# Patient Record
Sex: MALE | Race: WHITE | Employment: FULL TIME | URBAN - METROPOLITAN AREA
[De-identification: names, ages, dates, MRNs, and addresses within clinical notes are randomized per-mention and may not be internally consistent; named-entity substitution may affect disease eponyms.]

---

## 2020-04-05 ENCOUNTER — APPOINTMENT (OUTPATIENT)
Dept: GENERAL RADIOLOGY | Age: 30
End: 2020-04-05
Payer: COMMERCIAL

## 2020-04-05 ENCOUNTER — HOSPITAL ENCOUNTER (EMERGENCY)
Age: 30
Discharge: HOME | End: 2020-04-06
Attending: EMERGENCY MEDICINE
Payer: COMMERCIAL

## 2020-04-05 ENCOUNTER — APPOINTMENT (OUTPATIENT)
Dept: CT IMAGING | Age: 30
End: 2020-04-05
Payer: COMMERCIAL

## 2020-04-05 LAB
A/G RATIO: 1.5 (ref 1.1–2.2)
ALBUMIN SERPL-MCNC: 4.5 G/DL (ref 3.4–5)
ALP BLD-CCNC: 76 U/L (ref 40–129)
ALT SERPL-CCNC: 20 U/L (ref 10–40)
ANION GAP SERPL CALCULATED.3IONS-SCNC: 15 MMOL/L (ref 3–16)
AST SERPL-CCNC: 14 U/L (ref 15–37)
BASOPHILS ABSOLUTE: 0.1 K/UL (ref 0–0.2)
BASOPHILS RELATIVE PERCENT: 0.6 %
BILIRUB SERPL-MCNC: 1 MG/DL (ref 0–1)
BUN BLDV-MCNC: 11 MG/DL (ref 7–20)
CALCIUM SERPL-MCNC: 9.8 MG/DL (ref 8.3–10.6)
CHLORIDE BLD-SCNC: 104 MMOL/L (ref 99–110)
CO2: 22 MMOL/L (ref 21–32)
CREAT SERPL-MCNC: 1 MG/DL (ref 0.9–1.3)
D DIMER: 0.52 UG/ML FEU
EOSINOPHILS ABSOLUTE: 0.4 K/UL (ref 0–0.6)
EOSINOPHILS RELATIVE PERCENT: 3.2 %
GFR AFRICAN AMERICAN: >60
GFR NON-AFRICAN AMERICAN: >60
GLOBULIN: 3 G/DL
GLUCOSE BLD-MCNC: 100 MG/DL (ref 70–99)
HCT VFR BLD CALC: 48.9 % (ref 40.5–52.5)
HEMOGLOBIN: 15.6 G/DL (ref 13.5–17.5)
LYMPHOCYTES ABSOLUTE: 2.2 K/UL (ref 1–5.1)
LYMPHOCYTES RELATIVE PERCENT: 17.7 %
MCH RBC QN AUTO: 28 PG (ref 26–34)
MCHC RBC AUTO-ENTMCNC: 31.8 G/DL (ref 31–36)
MCV RBC AUTO: 88 FL (ref 80–100)
MONOCYTES ABSOLUTE: 1.2 K/UL (ref 0–1.3)
MONOCYTES RELATIVE PERCENT: 9.5 %
NEUTROPHILS ABSOLUTE: 8.7 K/UL (ref 1.7–7.7)
NEUTROPHILS RELATIVE PERCENT: 69 %
PDW BLD-RTO: 13.4 % (ref 12.4–15.4)
PLATELET # BLD: 274 K/UL (ref 135–450)
PMV BLD AUTO: 8.7 FL (ref 5–10.5)
POTASSIUM REFLEX MAGNESIUM: 3.9 MMOL/L (ref 3.5–5.1)
RAPID INFLUENZA  B AGN: NEGATIVE
RAPID INFLUENZA A AGN: NEGATIVE
RBC # BLD: 5.55 M/UL (ref 4.2–5.9)
SODIUM BLD-SCNC: 141 MMOL/L (ref 136–145)
TOTAL PROTEIN: 7.5 G/DL (ref 6.4–8.2)
TROPONIN: <0.01 NG/ML
WBC # BLD: 12.6 K/UL (ref 4–11)

## 2020-04-05 PROCEDURE — 85025 COMPLETE CBC W/AUTO DIFF WBC: CPT

## 2020-04-05 PROCEDURE — 6360000002 HC RX W HCPCS: Performed by: EMERGENCY MEDICINE

## 2020-04-05 PROCEDURE — 84484 ASSAY OF TROPONIN QUANT: CPT

## 2020-04-05 PROCEDURE — 80053 COMPREHEN METABOLIC PANEL: CPT

## 2020-04-05 PROCEDURE — 85379 FIBRIN DEGRADATION QUANT: CPT

## 2020-04-05 PROCEDURE — 71260 CT THORAX DX C+: CPT

## 2020-04-05 PROCEDURE — 87804 INFLUENZA ASSAY W/OPTIC: CPT

## 2020-04-05 PROCEDURE — 71046 X-RAY EXAM CHEST 2 VIEWS: CPT

## 2020-04-05 PROCEDURE — 96374 THER/PROPH/DIAG INJ IV PUSH: CPT

## 2020-04-05 PROCEDURE — 99285 EMERGENCY DEPT VISIT HI MDM: CPT

## 2020-04-05 PROCEDURE — 93005 ELECTROCARDIOGRAM TRACING: CPT | Performed by: EMERGENCY MEDICINE

## 2020-04-05 PROCEDURE — 6360000004 HC RX CONTRAST MEDICATION: Performed by: EMERGENCY MEDICINE

## 2020-04-05 PROCEDURE — 36415 COLL VENOUS BLD VENIPUNCTURE: CPT

## 2020-04-05 RX ORDER — KETOROLAC TROMETHAMINE 30 MG/ML
15 INJECTION, SOLUTION INTRAMUSCULAR; INTRAVENOUS ONCE
Status: COMPLETED | OUTPATIENT
Start: 2020-04-05 | End: 2020-04-05

## 2020-04-05 RX ORDER — HYDROCODONE BITARTRATE AND ACETAMINOPHEN 5; 325 MG/1; MG/1
1 TABLET ORAL EVERY 6 HOURS PRN
Qty: 12 TABLET | Refills: 0 | Status: SHIPPED | OUTPATIENT
Start: 2020-04-05 | End: 2020-04-08

## 2020-04-05 RX ORDER — INDOMETHACIN 50 MG/1
50 CAPSULE ORAL 3 TIMES DAILY PRN
COMMUNITY

## 2020-04-05 RX ADMIN — IOPAMIDOL 100 ML: 755 INJECTION, SOLUTION INTRAVENOUS at 23:10

## 2020-04-05 RX ADMIN — KETOROLAC TROMETHAMINE 15 MG: 30 INJECTION, SOLUTION INTRAMUSCULAR at 22:11

## 2020-04-05 ASSESSMENT — PAIN DESCRIPTION - PROGRESSION: CLINICAL_PROGRESSION: GRADUALLY WORSENING

## 2020-04-05 ASSESSMENT — ENCOUNTER SYMPTOMS
RHINORRHEA: 0
VOMITING: 0
SORE THROAT: 0
EYE REDNESS: 0
EYE DISCHARGE: 0
COUGH: 1
WHEEZING: 0
SHORTNESS OF BREATH: 1
BACK PAIN: 1
DIARRHEA: 1
NAUSEA: 0
EYE PAIN: 0
ABDOMINAL PAIN: 0

## 2020-04-05 ASSESSMENT — PAIN DESCRIPTION - LOCATION
LOCATION: BACK

## 2020-04-05 ASSESSMENT — PAIN SCALES - GENERAL
PAINLEVEL_OUTOF10: 8
PAINLEVEL_OUTOF10: 4
PAINLEVEL_OUTOF10: 8

## 2020-04-05 ASSESSMENT — PAIN DESCRIPTION - DIRECTION
RADIATING_TOWARDS: RIBS
RADIATING_TOWARDS: RIBS

## 2020-04-05 ASSESSMENT — PAIN DESCRIPTION - ONSET: ONSET: PROGRESSIVE

## 2020-04-05 ASSESSMENT — PAIN - FUNCTIONAL ASSESSMENT: PAIN_FUNCTIONAL_ASSESSMENT: PREVENTS OR INTERFERES SOME ACTIVE ACTIVITIES AND ADLS

## 2020-04-05 ASSESSMENT — PAIN DESCRIPTION - DESCRIPTORS: DESCRIPTORS: SHARP;ACHING

## 2020-04-05 ASSESSMENT — PAIN DESCRIPTION - PAIN TYPE
TYPE: ACUTE PAIN

## 2020-04-05 ASSESSMENT — PAIN DESCRIPTION - FREQUENCY: FREQUENCY: INTERMITTENT

## 2020-04-05 ASSESSMENT — PAIN DESCRIPTION - ORIENTATION
ORIENTATION: MID

## 2020-04-06 VITALS
WEIGHT: 315 LBS | RESPIRATION RATE: 18 BRPM | HEART RATE: 88 BPM | SYSTOLIC BLOOD PRESSURE: 140 MMHG | HEIGHT: 71 IN | DIASTOLIC BLOOD PRESSURE: 99 MMHG | OXYGEN SATURATION: 97 % | TEMPERATURE: 98.6 F | BODY MASS INDEX: 44.1 KG/M2

## 2020-04-06 LAB
EKG ATRIAL RATE: 78 BPM
EKG DIAGNOSIS: NORMAL
EKG P AXIS: -11 DEGREES
EKG P-R INTERVAL: 120 MS
EKG Q-T INTERVAL: 380 MS
EKG QRS DURATION: 98 MS
EKG QTC CALCULATION (BAZETT): 433 MS
EKG R AXIS: 44 DEGREES
EKG T AXIS: 27 DEGREES
EKG VENTRICULAR RATE: 78 BPM

## 2020-04-06 PROCEDURE — 93010 ELECTROCARDIOGRAM REPORT: CPT | Performed by: INTERNAL MEDICINE

## 2020-04-06 ASSESSMENT — PAIN DESCRIPTION - PAIN TYPE: TYPE: ACUTE PAIN

## 2020-04-06 ASSESSMENT — PAIN DESCRIPTION - LOCATION: LOCATION: BACK

## 2020-04-06 ASSESSMENT — PAIN SCALES - GENERAL: PAINLEVEL_OUTOF10: 4

## 2020-04-06 ASSESSMENT — PAIN DESCRIPTION - ORIENTATION: ORIENTATION: MID

## 2020-04-06 NOTE — ED NOTES
Entered patient's room with eye protection, mask, face shield, gloves, gown and hair cover. Discharge instructions reviewed with patient and verbalized understanding, denies further questions and successful teach back occurred. Discharged ambulatory with steady gait to ED lobby. Written discharge instructions, work note, prescription x1 and referral for PCP provided to patient.          Esteban Dao RN  04/06/20 3747

## 2020-04-06 NOTE — ED TRIAGE NOTES
This RN entered patient's room with eye protection, surgical mask, gown, gloves and face shield. Patient presents as walk in patient with c/o thoracic back pain that started yesterday evening, states he woke up this morning with a dry cough and shortness of breath. Also states he has had about 3 episodes of diarrhea this morning. Patient states he has been taking his temperature at home and denies fever. He describes his pain as constant aching that radiates around towards his bilateral ribs and occasionally intermittent sharp spasms. Patient states \"when I cough, it feels like I cannot get anything up\". Patient states he worked at [employer] this evening and decided to come to the ER after work because his symptoms appear to be worsening. Patient is awake, alert, oriented, skin w/d, cap refill brisk. Dr. Harley Javed in room to examine patient.

## 2020-04-06 NOTE — ED NOTES
This RN entered patient room with eye protection, surgical mask, face shield, gloves, gown and head covering. EKG completed, IV inserted. Patient is awake, alert, oriented, respirations easy & regular, conversing in complete sentences, skin w/d, MMM & pink, cap refill brisk. Patient watching television, given ice water and call light. Denies further needs at this time. Patient states he was diagnosed with bronchitis in January and was placed on an antibiotic and inhaler at that time. States he continues to use the albuterol inhaler as needed and that he has used it today.         Tess Alonso RN  04/05/20 7598

## 2020-04-06 NOTE — ED PROVIDER NOTES
normal.         Thought Content: Thought content normal.         DIAGNOSTIC RESULTS   LABS:    Results for orders placed or performed during the hospital encounter of 04/05/20   Rapid influenza A/B antigens   Result Value Ref Range    Rapid Influenza A Ag Negative Negative    Rapid Influenza B Ag Negative Negative   CBC Auto Differential   Result Value Ref Range    WBC 12.6 (H) 4.0 - 11.0 K/uL    RBC 5.55 4.20 - 5.90 M/uL    Hemoglobin 15.6 13.5 - 17.5 g/dL    Hematocrit 48.9 40.5 - 52.5 %    MCV 88.0 80.0 - 100.0 fL    MCH 28.0 26.0 - 34.0 pg    MCHC 31.8 31.0 - 36.0 g/dL    RDW 13.4 12.4 - 15.4 %    Platelets 644 659 - 819 K/uL    MPV 8.7 5.0 - 10.5 fL    Neutrophils % 69.0 %    Lymphocytes % 17.7 %    Monocytes % 9.5 %    Eosinophils % 3.2 %    Basophils % 0.6 %    Neutrophils Absolute 8.7 (H) 1.7 - 7.7 K/uL    Lymphocytes Absolute 2.2 1.0 - 5.1 K/uL    Monocytes Absolute 1.2 0.0 - 1.3 K/uL    Eosinophils Absolute 0.4 0.0 - 0.6 K/uL    Basophils Absolute 0.1 0.0 - 0.2 K/uL   Comprehensive Metabolic Panel w/ Reflex to MG   Result Value Ref Range    Sodium 141 136 - 145 mmol/L    Potassium reflex Magnesium 3.9 3.5 - 5.1 mmol/L    Chloride 104 99 - 110 mmol/L    CO2 22 21 - 32 mmol/L    Anion Gap 15 3 - 16    Glucose 100 (H) 70 - 99 mg/dL    BUN 11 7 - 20 mg/dL    CREATININE 1.0 0.9 - 1.3 mg/dL    GFR Non-African American >60 >60    GFR African American >60 >60    Calcium 9.8 8.3 - 10.6 mg/dL    Total Protein 7.5 6.4 - 8.2 g/dL    Alb 4.5 3.4 - 5.0 g/dL    Albumin/Globulin Ratio 1.5 1.1 - 2.2    Total Bilirubin 1.0 0.0 - 1.0 mg/dL    Alkaline Phosphatase 76 40 - 129 U/L    ALT 20 10 - 40 U/L    AST 14 (L) 15 - 37 U/L    Globulin 3.0 g/dL   Troponin   Result Value Ref Range    Troponin <0.01 <0.01 ng/mL   D-Dimer, Quantitative   Result Value Ref Range    D-Dimer, Quant 0.52 (H) <0.50 ug/mL FEU       All other labs were within normal range or not returned as of this dictation. EKG:  All EKG's are interpreted by the Emergency Department Physician who either signs orCo-signs this chart in the absence of a cardiologist.    EKG visualized preliminarily interpreted by myself. The rhythm is sinus with a rate of 78 and a normal axis of 44. ST-T waves and intervals are all within normal limits. RADIOLOGY:   Non-plain film images such as CT, Ultrasound and MRI are read by the radiologist. Paulla Situ radiographic images are visualized and preliminarily interpreted by the  EDProvider with the below findings:  Xr Chest Standard (2 Vw)    Result Date: 4/5/2020  EXAMINATION: TWO XRAY VIEWS OF THE CHEST 4/5/2020 9:36 pm COMPARISON: None. HISTORY: ORDERING SYSTEM PROVIDED HISTORY: Cough, pleuritic pain TECHNOLOGIST PROVIDED HISTORY: Reason for exam:->Cough, pleuritic pain Reason for Exam: cough, sob, upper back pain since yesterday Acuity: Acute Type of Exam: Initial Relevant Medical/Surgical History:  Current Some Day Smoker FINDINGS: The lungs are clear. There is no pleural effusion. The cardiomediastinal silhouette is normal. There is no pneumothorax. No acute disease. Ct Chest Pulmonary Embolism W Contrast    Result Date: 4/5/2020  EXAMINATION: CTA OF THE CHEST 4/5/2020 11:11 pm TECHNIQUE: CTA of the chest was performed after the administration of intravenous contrast.  Multiplanar reformatted images are provided for review. MIP images are provided for review. Dose modulation, iterative reconstruction, and/or weight based adjustment of the mA/kV was utilized to reduce the radiation dose to as low as reasonably achievable.  COMPARISON: Chest x-ray 04/05/2020 2136 hours HISTORY: ORDERING SYSTEM PROVIDED HISTORY: pleuritic pain and SOB TECHNOLOGIST PROVIDED HISTORY: Reason for exam:->pleuritic pain and SOB Reason for Exam: cough, sob, mid thoracic back pain since yesterday Acuity: Acute Type of Exam: Initial Additional signs and symptoms: Patient scanned twice, best scan possible Relevant Medical/Surgical History:  Current Some Day

## 2020-04-07 ENCOUNTER — CARE COORDINATION (OUTPATIENT)
Dept: CASE MANAGEMENT | Age: 30
End: 2020-04-07

## 2020-04-08 ENCOUNTER — CARE COORDINATION (OUTPATIENT)
Dept: CASE MANAGEMENT | Age: 30
End: 2020-04-08

## 2020-04-08 NOTE — CARE COORDINATION
number:   Based on Loop alert triggers, patient will be contacted by nurse care manager for worsening symptoms.     Plan for follow-up call in 14 days based on symptoms

## 2020-04-23 ENCOUNTER — CARE COORDINATION (OUTPATIENT)
Dept: CASE MANAGEMENT | Age: 30
End: 2020-04-23

## 2023-03-10 ENCOUNTER — APPOINTMENT (OUTPATIENT)
Dept: GENERAL RADIOLOGY | Age: 33
DRG: 313 | End: 2023-03-10

## 2023-03-10 ENCOUNTER — APPOINTMENT (OUTPATIENT)
Dept: CT IMAGING | Age: 33
DRG: 313 | End: 2023-03-10

## 2023-03-10 ENCOUNTER — HOSPITAL ENCOUNTER (INPATIENT)
Age: 33
LOS: 2 days | Discharge: HOME | DRG: 313 | End: 2023-03-14
Attending: EMERGENCY MEDICINE | Admitting: INTERNAL MEDICINE

## 2023-03-10 DIAGNOSIS — R07.9 CHEST PAIN, UNSPECIFIED TYPE: Primary | ICD-10-CM

## 2023-03-10 DIAGNOSIS — R29.818 SUSPECTED SLEEP APNEA: ICD-10-CM

## 2023-03-10 DIAGNOSIS — R55 SYNCOPE AND COLLAPSE: ICD-10-CM

## 2023-03-10 DIAGNOSIS — M79.601 RIGHT ARM PAIN: ICD-10-CM

## 2023-03-10 LAB
A/G RATIO: 1.3 (ref 1.1–2.2)
ALBUMIN SERPL-MCNC: 4 G/DL (ref 3.4–5)
ALP BLD-CCNC: 81 U/L (ref 40–129)
ALT SERPL-CCNC: 28 U/L (ref 10–40)
ANION GAP SERPL CALCULATED.3IONS-SCNC: 10 MMOL/L (ref 3–16)
AST SERPL-CCNC: 16 U/L (ref 15–37)
BASOPHILS ABSOLUTE: 0 K/UL (ref 0–0.2)
BASOPHILS RELATIVE PERCENT: 0.2 %
BILIRUB SERPL-MCNC: 0.8 MG/DL (ref 0–1)
BUN BLDV-MCNC: 12 MG/DL (ref 7–20)
CALCIUM SERPL-MCNC: 9.5 MG/DL (ref 8.3–10.6)
CHLORIDE BLD-SCNC: 101 MMOL/L (ref 99–110)
CO2: 22 MMOL/L (ref 21–32)
CREAT SERPL-MCNC: 0.8 MG/DL (ref 0.9–1.3)
D DIMER: 0.73 UG/ML FEU (ref 0–0.6)
EOSINOPHILS ABSOLUTE: 0.1 K/UL (ref 0–0.6)
EOSINOPHILS RELATIVE PERCENT: 0.9 %
GFR SERPL CREATININE-BSD FRML MDRD: >60 ML/MIN/{1.73_M2}
GLUCOSE BLD-MCNC: 166 MG/DL (ref 70–99)
GLUCOSE BLD-MCNC: 170 MG/DL
GLUCOSE BLD-MCNC: 170 MG/DL (ref 70–99)
HCT VFR BLD CALC: 43.6 % (ref 40.5–52.5)
HEMOGLOBIN: 14.6 G/DL (ref 13.5–17.5)
LACTIC ACID: 2.2 MMOL/L (ref 0.4–2)
LIPASE: 39 U/L (ref 13–60)
LYMPHOCYTES ABSOLUTE: 1.4 K/UL (ref 1–5.1)
LYMPHOCYTES RELATIVE PERCENT: 13.9 %
MCH RBC QN AUTO: 28.5 PG (ref 26–34)
MCHC RBC AUTO-ENTMCNC: 33.5 G/DL (ref 31–36)
MCV RBC AUTO: 85.2 FL (ref 80–100)
MONOCYTES ABSOLUTE: 0.6 K/UL (ref 0–1.3)
MONOCYTES RELATIVE PERCENT: 6.1 %
NEUTROPHILS ABSOLUTE: 8.1 K/UL (ref 1.7–7.7)
NEUTROPHILS RELATIVE PERCENT: 78.9 %
PDW BLD-RTO: 14.7 % (ref 12.4–15.4)
PERFORMED ON: ABNORMAL
PLATELET # BLD: 276 K/UL (ref 135–450)
PMV BLD AUTO: 8.7 FL (ref 5–10.5)
POTASSIUM REFLEX MAGNESIUM: 3.9 MMOL/L (ref 3.5–5.1)
PRO-BNP: 26 PG/ML (ref 0–124)
RAPID INFLUENZA  B AGN: NEGATIVE
RAPID INFLUENZA A AGN: NEGATIVE
RBC # BLD: 5.11 M/UL (ref 4.2–5.9)
SARS-COV-2, NAAT: NOT DETECTED
SODIUM BLD-SCNC: 133 MMOL/L (ref 136–145)
TOTAL PROTEIN: 7.2 G/DL (ref 6.4–8.2)
TROPONIN: <0.01 NG/ML
TSH REFLEX: 2.58 UIU/ML (ref 0.27–4.2)
WBC # BLD: 10.2 K/UL (ref 4–11)

## 2023-03-10 PROCEDURE — 93005 ELECTROCARDIOGRAM TRACING: CPT | Performed by: EMERGENCY MEDICINE

## 2023-03-10 PROCEDURE — 71046 X-RAY EXAM CHEST 2 VIEWS: CPT

## 2023-03-10 PROCEDURE — 80053 COMPREHEN METABOLIC PANEL: CPT

## 2023-03-10 PROCEDURE — 6360000002 HC RX W HCPCS: Performed by: INTERNAL MEDICINE

## 2023-03-10 PROCEDURE — 93005 ELECTROCARDIOGRAM TRACING: CPT | Performed by: INTERNAL MEDICINE

## 2023-03-10 PROCEDURE — 87635 SARS-COV-2 COVID-19 AMP PRB: CPT

## 2023-03-10 PROCEDURE — 83605 ASSAY OF LACTIC ACID: CPT

## 2023-03-10 PROCEDURE — 84443 ASSAY THYROID STIM HORMONE: CPT

## 2023-03-10 PROCEDURE — 87804 INFLUENZA ASSAY W/OPTIC: CPT

## 2023-03-10 PROCEDURE — 96372 THER/PROPH/DIAG INJ SC/IM: CPT

## 2023-03-10 PROCEDURE — 85025 COMPLETE CBC W/AUTO DIFF WBC: CPT

## 2023-03-10 PROCEDURE — 71260 CT THORAX DX C+: CPT | Performed by: NURSE PRACTITIONER

## 2023-03-10 PROCEDURE — 84484 ASSAY OF TROPONIN QUANT: CPT

## 2023-03-10 PROCEDURE — 83690 ASSAY OF LIPASE: CPT

## 2023-03-10 PROCEDURE — G0378 HOSPITAL OBSERVATION PER HR: HCPCS

## 2023-03-10 PROCEDURE — 2580000003 HC RX 258: Performed by: INTERNAL MEDICINE

## 2023-03-10 PROCEDURE — 36415 COLL VENOUS BLD VENIPUNCTURE: CPT

## 2023-03-10 PROCEDURE — 83880 ASSAY OF NATRIURETIC PEPTIDE: CPT

## 2023-03-10 PROCEDURE — 6370000000 HC RX 637 (ALT 250 FOR IP): Performed by: INTERNAL MEDICINE

## 2023-03-10 PROCEDURE — 85379 FIBRIN DEGRADATION QUANT: CPT

## 2023-03-10 PROCEDURE — 99285 EMERGENCY DEPT VISIT HI MDM: CPT

## 2023-03-10 PROCEDURE — 6360000004 HC RX CONTRAST MEDICATION: Performed by: EMERGENCY MEDICINE

## 2023-03-10 RX ORDER — ACETAMINOPHEN 650 MG/1
650 SUPPOSITORY RECTAL EVERY 6 HOURS PRN
Status: DISCONTINUED | OUTPATIENT
Start: 2023-03-10 | End: 2023-03-14 | Stop reason: HOSPADM

## 2023-03-10 RX ORDER — TIZANIDINE 4 MG/1
4 TABLET ORAL 3 TIMES DAILY PRN
COMMUNITY
Start: 2021-12-17

## 2023-03-10 RX ORDER — COLCHICINE 0.6 MG/1
0.6 TABLET ORAL 2 TIMES DAILY
Status: DISCONTINUED | OUTPATIENT
Start: 2023-03-10 | End: 2023-03-14 | Stop reason: HOSPADM

## 2023-03-10 RX ORDER — ALLOPURINOL 300 MG/1
300 TABLET ORAL DAILY
Status: DISCONTINUED | OUTPATIENT
Start: 2023-03-11 | End: 2023-03-14 | Stop reason: HOSPADM

## 2023-03-10 RX ORDER — ONDANSETRON 2 MG/ML
4 INJECTION INTRAMUSCULAR; INTRAVENOUS EVERY 6 HOURS PRN
Status: DISCONTINUED | OUTPATIENT
Start: 2023-03-10 | End: 2023-03-14 | Stop reason: HOSPADM

## 2023-03-10 RX ORDER — ENOXAPARIN SODIUM 100 MG/ML
60 INJECTION SUBCUTANEOUS 2 TIMES DAILY
Status: DISCONTINUED | OUTPATIENT
Start: 2023-03-10 | End: 2023-03-14 | Stop reason: HOSPADM

## 2023-03-10 RX ORDER — ONDANSETRON 4 MG/1
4 TABLET, ORALLY DISINTEGRATING ORAL EVERY 8 HOURS PRN
Status: DISCONTINUED | OUTPATIENT
Start: 2023-03-10 | End: 2023-03-14 | Stop reason: HOSPADM

## 2023-03-10 RX ORDER — SODIUM CHLORIDE 0.9 % (FLUSH) 0.9 %
5-40 SYRINGE (ML) INJECTION PRN
Status: DISCONTINUED | OUTPATIENT
Start: 2023-03-10 | End: 2023-03-14 | Stop reason: HOSPADM

## 2023-03-10 RX ORDER — ACETAMINOPHEN 325 MG/1
650 TABLET ORAL EVERY 6 HOURS PRN
Status: DISCONTINUED | OUTPATIENT
Start: 2023-03-10 | End: 2023-03-14 | Stop reason: HOSPADM

## 2023-03-10 RX ORDER — COLCHICINE 0.6 MG/1
0.6 TABLET ORAL 2 TIMES DAILY
COMMUNITY
Start: 2022-08-17

## 2023-03-10 RX ORDER — INDOMETHACIN 25 MG/1
50 CAPSULE ORAL 3 TIMES DAILY PRN
Status: DISCONTINUED | OUTPATIENT
Start: 2023-03-10 | End: 2023-03-11

## 2023-03-10 RX ORDER — TIZANIDINE 4 MG/1
4 TABLET ORAL 3 TIMES DAILY PRN
Status: DISCONTINUED | OUTPATIENT
Start: 2023-03-10 | End: 2023-03-14 | Stop reason: HOSPADM

## 2023-03-10 RX ORDER — SODIUM CHLORIDE 9 MG/ML
INJECTION, SOLUTION INTRAVENOUS PRN
Status: DISCONTINUED | OUTPATIENT
Start: 2023-03-10 | End: 2023-03-14 | Stop reason: HOSPADM

## 2023-03-10 RX ORDER — POLYETHYLENE GLYCOL 3350 17 G/17G
17 POWDER, FOR SOLUTION ORAL DAILY PRN
Status: DISCONTINUED | OUTPATIENT
Start: 2023-03-10 | End: 2023-03-14 | Stop reason: HOSPADM

## 2023-03-10 RX ORDER — ASPIRIN 81 MG/1
81 TABLET, CHEWABLE ORAL DAILY
Status: DISCONTINUED | OUTPATIENT
Start: 2023-03-11 | End: 2023-03-14 | Stop reason: HOSPADM

## 2023-03-10 RX ORDER — ATORVASTATIN CALCIUM 80 MG/1
80 TABLET, FILM COATED ORAL NIGHTLY
Status: DISCONTINUED | OUTPATIENT
Start: 2023-03-10 | End: 2023-03-14 | Stop reason: HOSPADM

## 2023-03-10 RX ORDER — SODIUM CHLORIDE 0.9 % (FLUSH) 0.9 %
5-40 SYRINGE (ML) INJECTION EVERY 12 HOURS SCHEDULED
Status: DISCONTINUED | OUTPATIENT
Start: 2023-03-10 | End: 2023-03-14 | Stop reason: HOSPADM

## 2023-03-10 RX ORDER — ALLOPURINOL 300 MG/1
300 TABLET ORAL DAILY
COMMUNITY
Start: 2021-08-24

## 2023-03-10 RX ADMIN — INDOMETHACIN 50 MG: 25 CAPSULE ORAL at 21:48

## 2023-03-10 RX ADMIN — Medication 10 ML: at 21:49

## 2023-03-10 RX ADMIN — IOPAMIDOL 75 ML: 755 INJECTION, SOLUTION INTRAVENOUS at 16:05

## 2023-03-10 RX ADMIN — ENOXAPARIN SODIUM 60 MG: 100 INJECTION SUBCUTANEOUS at 21:49

## 2023-03-10 RX ADMIN — COLCHICINE 0.6 MG: 0.6 TABLET ORAL at 21:49

## 2023-03-10 RX ADMIN — ATORVASTATIN CALCIUM 80 MG: 80 TABLET, FILM COATED ORAL at 21:48

## 2023-03-10 ASSESSMENT — PAIN SCALES - GENERAL
PAINLEVEL_OUTOF10: 7
PAINLEVEL_OUTOF10: 7

## 2023-03-10 ASSESSMENT — PAIN DESCRIPTION - DESCRIPTORS
DESCRIPTORS: TINGLING
DESCRIPTORS: PINS AND NEEDLES;TINGLING

## 2023-03-10 ASSESSMENT — PAIN DESCRIPTION - PAIN TYPE: TYPE: ACUTE PAIN

## 2023-03-10 ASSESSMENT — PAIN DESCRIPTION - LOCATION
LOCATION: CHEST;ARM
LOCATION: CHEST;ARM

## 2023-03-10 ASSESSMENT — PAIN - FUNCTIONAL ASSESSMENT
PAIN_FUNCTIONAL_ASSESSMENT: PREVENTS OR INTERFERES SOME ACTIVE ACTIVITIES AND ADLS
PAIN_FUNCTIONAL_ASSESSMENT: PREVENTS OR INTERFERES SOME ACTIVE ACTIVITIES AND ADLS

## 2023-03-10 ASSESSMENT — HEART SCORE: ECG: 0

## 2023-03-10 ASSESSMENT — PAIN DESCRIPTION - ORIENTATION
ORIENTATION: RIGHT
ORIENTATION: RIGHT

## 2023-03-10 NOTE — ED TRIAGE NOTES
I have personally performed a face to face diagnostic evaluation on this patient. I have fully participated in the care of this patient I personally saw the patient and performed a substantive portion of the visit including all aspects of the medical decision making. I have reviewed and agree with all pertinent clinical information including history, physical exam, diagnostic tests, and the plan. HPI: Yolanda Ayoub presented with chest pain that radiates to his right arm patient has history of early death in the family with CAD. Patient was working when he got lightheaded and fell lost consciousness slumped to the ground. Patient having some right arm pain and some nausea as well as intermittent right ear pain. The chest pain is still present. Patient has a history of hypertension and obesity but no other chronic past medical history that he is aware of. See FLORENTIN note for further details. Chief Complaint   Patient presents with    Chest Pain     Chest pain 7/10 pressure like pain midsternal and radiates to rt arm. No reported personal hx of heart issues but family hx. Loss of Consciousness     Pt reports passing out at work. States he did not hit his head but did have loc. Pt states he slumped to the ground. Otalgia     Rt ear pain, hx of ear infection. Nausea     Reports nausea with cp. Denies vomiting. Arm Pain     Pt states rt arm pain, stated he may have hurt it when he passed out.       Review of Systems: See FLORENTIN note  Vital Signs: BP (!) 178/99   Pulse 79   Temp 98.1 °F (36.7 °C) (Oral)   Resp 26   Ht 5' 11\" (1.803 m)   Wt (!) 422 lb 10 oz (191.7 kg)   SpO2 93%   BMI 58.94 kg/m²     Alert 34 y.o. male who does not appear toxic or acutely ill  HENT: Atraumatic, oral mucosa moist  Neck: Grossly normal ROM  Chest/Lungs: respiratory effort normal   Abdomen: Soft nontender  Musculoskeletal: Grossly normal ROM  Skin: No palor or diaphoresis    Medical Decision Making and Plan:  Pertinent Labs & Imaging studies reviewed. (See FLORENTIN chart for details)  I agree with FLORENTIN assessment and plan. 72-year-old male presents for chest pain and syncope. Significant risk factors that patient is greater than 400 pounds with a BMI close to 60 and significant family history of CAD his father  in his 45s from a heart attack. Patient has a slightly elevated lactic. Concern for cardiac syncope TSH is normal CBC is unremarkable patient's D-dimer is elevated however his CT PE shows no signs of PE. Chest x-ray is relatively unremarkable. However given patient's significant risk factors and persistent chest pain will obtain second troponin and second EKG and plan on admission for cardiac work-up at this time. See FLORENTIN note for further details. Shared decision-making was used for admission. I personally saw the patient and independently provided 0 minutes of nonconcurrent critical care out of the total shared critical care time provided    EKG: All EKG's are interpreted by the Emergency Department Physician who either signs or Co-signs this chart in the absence of a cardiologist.    EKG Interpretation    Interpreted by emergency department physician    Rhythm: normal sinus   Rate: normal  Axis: normal  Ectopy: none  Conduction: normal  ST Segments: normal  T Waves: normal  Q Waves: none    Clinical Impression: Normal sinus rhythm, no significant T wave or ST changes. Normal CA interval, normal QRS duration, normal QT QTC. Normal axis. No arrhythmia or signs of ischemia. Otherwise normal EKG. Interpreted by myself.

## 2023-03-10 NOTE — ED PROVIDER NOTES
[address]  [address] [address]  Dept: [phone]  Loc: [address] ENCOUNTER        This patient was seen and evaluated per myself in conjunction with ED attending Dr. Romana President. CHIEF COMPLAINT    Chief Complaint   Patient presents with    Chest Pain     Chest pain 7/10 pressure like pain midsternal and radiates to rt arm. No reported personal hx of heart issues but family hx. Loss of Consciousness     Pt reports passing out at work. States he did not hit his head but did have loc. Pt states he slumped to the ground. Otalgia     Rt ear pain, hx of ear infection. Nausea     Reports nausea with cp. Denies vomiting. Arm Pain     Pt states rt arm pain, stated he may have hurt it when he passed out. HPI    Omi Narayanan is a 34 y.o. male who presents with a syncopal episode. Onset was just prior to arrival. The duration of the syncopal episode was uncertain but he states that it had to of been brief. 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 He started experiencing chest pain that radiated across his chest to his right arm. States that was pressure-like in nature, that is when he grew lightheaded and passed out. He states that he does not think he had hit his head just slumped down. Hence no headache, diplopia or visual changes. Has been nauseous but no vomiting or diarrhea. No personal significant medical history other than gout and morbid obesity. Significant CAD family history. No history in the family of DVT or PE. Came to the ED with his mother for further evaluation and treatment.     REVIEW OF SYSTEMS    Cardiac: no palpitations, + sternal that radiates across to the right arm chest pain  Respiratory: no SOB, no new cough  Neurologic: see HPI, no headache, no double vision  GI: no vomiting, no diarrhea  Hematologic: no hematochezia, no hemoptysis  General: no fever, no chills  Musculoskeletal: see HPI  All other systems reviewed and are negative. Jayden Garcia PAST MEDICAL & SURGICAL HISTORY    Past Medical History:   Diagnosis Date    Kidney disorder     patient has a horseshoe shaped kidney     Past Surgical History:   Procedure Laterality Date    EYE SURGERY  2000       CURRENT MEDICATIONS  (may include discharge medications prescribed in the ED)  Current Outpatient Rx   Medication Sig Dispense Refill    indomethacin (INDOCIN) 50 MG capsule Take 50 mg by mouth 3 times daily as needed for Pain         ALLERGIES    Allergies   Allergen Reactions    Augmentin [Amoxicillin-Pot Clavulanate] Nausea Only       SOCIAL & FAMILY HISTORY    Social History     Socioeconomic History    Marital status: Single   Tobacco Use    Smoking status: Some Days    Smokeless tobacco: Never   Vaping Use    Vaping Use: Never used   Substance and Sexual Activity    Alcohol use: Yes     Comment: occasional    Drug use: Yes     Types: Marijuana Shellye Burkitt)     Comment: yesterday evening     No family history on file.     PHYSICAL EXAM    VITAL SIGNS: BP (!) 178/99   Pulse 79   Temp 98.1 °F (36.7 °C) (Oral)   Resp 26   Ht 5' 11\" (1.803 m)   Wt (!) 422 lb 10 oz (191.7 kg)   SpO2 93%   BMI 58.94 kg/m²    Constitutional:  Well developed, well nourished, no acute distress  Eyes: Pupils equally round and reactive to light, sclera nonicteric  HENT:  normocephalic, Atraumatic, see integument, moist mucus membranes  Neck: supple, no JVD, no posterior neck tenderness  Respiratory:  Lungs clear to auscultation bilaterally, no retractions   Cardiovascular:  Regular rate, no murmurs  GI:  Soft, nontender, normal bowel sounds  Musculoskeletal:  No edema, no acute deformities  Integument:  Skin warm and dry, no petechiae, w/d/i  Neurologic: Awake, alert, oriented x4, no aphasia, no slurred speech, CN II-XII intact,normal finger to nose test bilaterally, 5/5 strength in all 4 extremities, sensation to light touch intact bilaterally, no ataxia or gait abnormalities  Vascular: Radial and DP pulses 2+ and equal bilaterally  Psychiatric: Cooperative, pleasant affect     EKG   Please see the physician's note for EKG interpretation.         LABS  Results for orders placed or performed during the hospital encounter of 03/10/23   COVID-19, Rapid    Specimen: Nasopharyngeal Swab   Result Value Ref Range    SARS-CoV-2, NAAT Not Detected Not Detected   Rapid influenza A/B antigens    Specimen: Nasopharyngeal   Result Value Ref Range    Rapid Influenza A Ag Negative Negative    Rapid Influenza B Ag Negative Negative   CBC with Auto Differential   Result Value Ref Range    WBC 10.2 4.0 - 11.0 K/uL    RBC 5.11 4.20 - 5.90 M/uL    Hemoglobin 14.6 13.5 - 17.5 g/dL    Hematocrit 43.6 40.5 - 52.5 %    MCV 85.2 80.0 - 100.0 fL    MCH 28.5 26.0 - 34.0 pg    MCHC 33.5 31.0 - 36.0 g/dL    RDW 14.7 12.4 - 15.4 %    Platelets 702 784 - 475 K/uL    MPV 8.7 5.0 - 10.5 fL    Neutrophils % 78.9 %    Lymphocytes % 13.9 %    Monocytes % 6.1 %    Eosinophils % 0.9 %    Basophils % 0.2 %    Neutrophils Absolute 8.1 (H) 1.7 - 7.7 K/uL    Lymphocytes Absolute 1.4 1.0 - 5.1 K/uL    Monocytes Absolute 0.6 0.0 - 1.3 K/uL    Eosinophils Absolute 0.1 0.0 - 0.6 K/uL    Basophils Absolute 0.0 0.0 - 0.2 K/uL   CMP w/ Reflex to MG   Result Value Ref Range    Sodium 133 (L) 136 - 145 mmol/L    Potassium reflex Magnesium 3.9 3.5 - 5.1 mmol/L    Chloride 101 99 - 110 mmol/L    CO2 22 21 - 32 mmol/L    Anion Gap 10 3 - 16    Glucose 166 (H) 70 - 99 mg/dL    BUN 12 7 - 20 mg/dL    Creatinine 0.8 (L) 0.9 - 1.3 mg/dL    Est, Glom Filt Rate >60 >60    Calcium 9.5 8.3 - 10.6 mg/dL    Total Protein 7.2 6.4 - 8.2 g/dL    Albumin 4.0 3.4 - 5.0 g/dL    Albumin/Globulin Ratio 1.3 1.1 - 2.2    Total Bilirubin 0.8 0.0 - 1.0 mg/dL    Alkaline Phosphatase 81 40 - 129 U/L    ALT 28 10 - 40 U/L    AST 16 15 - 37 U/L   Lipase   Result Value Ref Range    Lipase 39.0 13.0 - 60.0 U/L   Troponin   Result Value Ref Range    Troponin <0.01 <0.01 ng/mL   BNP   Result Value Ref Range    Pro-BNP 26 0 - 124 pg/mL   D-Dimer, Quantitative   Result Value Ref Range    D-Dimer, Quant 0.73 (H) 0.00 - 0.60 ug/mL FEU   TSH with Reflex   Result Value Ref Range    TSH 2.58 0.27 - 4.20 uIU/mL   Lactic Acid   Result Value Ref Range    Lactic Acid 2.2 (H) 0.4 - 2.0 mmol/L   POCT Glucose   Result Value Ref Range    Glucose 170 mg/dL   POCT Glucose   Result Value Ref Range    POC Glucose 170 (H) 70 - 99 mg/dl    Performed on [facility]          RADIOLOGY  CT CHEST PULMONARY EMBOLISM W CONTRAST   Final Result   No evidence of pulmonary embolism or acute pulmonary abnormality. XR CHEST (2 VW)   Final Result   Haziness in the lungs could be partly from patient's body habitus versus   minimal congestion. No focal consolidation. No pneumothorax. ED COURSE & MEDICAL DECISION MAKING    Pertinent Labs & Imaging studies reviewed and interpreted. (See chart for details)  See chart for details of medications given during the ED stay. Vitals:    03/10/23 1700 03/10/23 1715 03/10/23 1730 03/10/23 1745   BP: (!) 158/103 (!) 162/118 (!) 167/106 (!) 178/99   Pulse: 82 93 55 79   Resp: 18 19 27 26   Temp:       TempSrc:       SpO2: 100% 96% 98% 93%   Weight:       Height:               History from : Patient    Limitations to history : None    Chronic Conditions: Gout and morbid obesity    CONSULTS: (Who and What was discussed)  None    Discussion with Other Profesionals : Admitting Team hospitalist    Social Determinants : None    Records Reviewed : None    CC/HPI Summary, DDx, ED Course, and Reassessment: See HPI and above for full presentation and physical exam.    Patient is a pleasant 68-year-old male who presents to the ED today after syncopal episode. He was loading heavy boxes into a truck at work, started experiencing some substernal chest pain that radiated to his right arm.   He grew lightheaded and nauseous. He then had a loss of consciousness. He had no shortness of breath. No palpitations. He states that he felt like he was going to pass out before he did. Having some right arm pain still. No history of coagulopathy in the family, does have a significant history of CAD however in the family. Denies any calf pain or lower extremity edema. Came to the ED for further evaluation and treatment. On arrival he is afebrile and hemodynamically stable. Nontoxic in appearance. Alert and oriented x4. Answering all questions appropriately. Pupils 3 mm and PERRL bilaterally. EOMs intact. Mucous membranes are pink and dry. Moving all 4 extremities spontaneously and equally. Sensation to light touch intact in all 4 extremities. No ataxia or gait abnormalities. No meningismus. Cardiac RRR. Lung sounds clear. Abdomen is soft, rounded, nontender. Bowel sounds are present. No lacerations abrasions or rashes. Will perform cardiac work-up and syncopal work-up and reevaluate after    Differential Diagnosis: Cardiac arrhythmia, drop attack from a subarachnoid hemorrhage, sepsis, dehydration, vasovagal syncope, other    CRITICAL CARE NOTE:  There was a high probability of clinically significant life-threatening deterioration of the patient's condition requiring my urgent intervention. Total critical care time was at least 15 minutes. This includes vital sign monitoring, pulse oximetry monitoring, telemetry monitoring, clinical response to the IV medications, reviewing the nursing notes, consultation time, dictation/documentation time, and interpretation of the labwork. This excludes any separately billable procedures performed. Cardiac Monitor Strip Interpretation  Interpreted by me  Monitor strip interpreted:  Rhythm: normal sinus   Rate: normal  Ectopy: none    Point-of-care glucose 170. Patient is afebrile and nontoxic in appearance.  Labs reveal no leukocytosis or anemia. Metabolic panel unremarkable. Urinalysis unremarkable. LFTs and lipase unremarkable. BNP unremarkable. D-dimer elevated 0.73. CT PE study ordered. TSH within defined limits. COVID and flu negative    CXR findings as above but per my radiology read I note no focal consolidation or ARDS. Verified with radiologist read. See above for full radiology. CT findings as above. EKG interpreted by ED physician. Troponin negative. Given that he had a sudden syncopal episode after developing substernal chest pain, nausea and was diaphoretic I do believe that he needs to be admitted for cardiac syncope rule out. Has a significant family history of MI including his father passing away at 44yo due to MI; or [employer] to the hospitalist for admission    Disposition Considerations (Tests not ordered but considered, Shared Decision Making, Pt Expectation of Test or Tx.): n/a      I am the Primary Clinician of Record. FINAL IMPRESSION    1. Chest pain, unspecified type    2. Syncope and collapse    3.  Right arm pain        PLAN  Admission       (Please note that this note was completed with a voice recognition program.  Every attempt was made to edit the dictations, but inevitably there remain words that are mis-transcribed.)        Karen Goyal, YENNI - CNP  03/10/23 1572

## 2023-03-11 ENCOUNTER — APPOINTMENT (OUTPATIENT)
Dept: GENERAL RADIOLOGY | Age: 33
DRG: 313 | End: 2023-03-11

## 2023-03-11 LAB
EKG ATRIAL RATE: 89 BPM
EKG ATRIAL RATE: 98 BPM
EKG DIAGNOSIS: NORMAL
EKG DIAGNOSIS: NORMAL
EKG P AXIS: 54 DEGREES
EKG P AXIS: 57 DEGREES
EKG P-R INTERVAL: 146 MS
EKG P-R INTERVAL: 148 MS
EKG Q-T INTERVAL: 374 MS
EKG Q-T INTERVAL: 392 MS
EKG QRS DURATION: 94 MS
EKG QRS DURATION: 98 MS
EKG QTC CALCULATION (BAZETT): 476 MS
EKG QTC CALCULATION (BAZETT): 477 MS
EKG R AXIS: 41 DEGREES
EKG R AXIS: 46 DEGREES
EKG T AXIS: 51 DEGREES
EKG T AXIS: 61 DEGREES
EKG VENTRICULAR RATE: 89 BPM
EKG VENTRICULAR RATE: 98 BPM
HCT VFR BLD CALC: 41.3 % (ref 40.5–52.5)
HEMOGLOBIN: 13.5 G/DL (ref 13.5–17.5)
LV EF: 55 %
LVEF MODALITY: NORMAL
MCH RBC QN AUTO: 28.1 PG (ref 26–34)
MCHC RBC AUTO-ENTMCNC: 32.7 G/DL (ref 31–36)
MCV RBC AUTO: 86.1 FL (ref 80–100)
PDW BLD-RTO: 14.7 % (ref 12.4–15.4)
PLATELET # BLD: 265 K/UL (ref 135–450)
PMV BLD AUTO: 8.9 FL (ref 5–10.5)
RBC # BLD: 4.79 M/UL (ref 4.2–5.9)
TROPONIN: <0.01 NG/ML
URIC ACID, SERUM: 10.1 MG/DL (ref 3.5–7.2)
WBC # BLD: 8.4 K/UL (ref 4–11)

## 2023-03-11 PROCEDURE — G0378 HOSPITAL OBSERVATION PER HR: HCPCS

## 2023-03-11 PROCEDURE — 6360000004 HC RX CONTRAST MEDICATION: Performed by: INTERNAL MEDICINE

## 2023-03-11 PROCEDURE — 2580000003 HC RX 258: Performed by: INTERNAL MEDICINE

## 2023-03-11 PROCEDURE — 96372 THER/PROPH/DIAG INJ SC/IM: CPT

## 2023-03-11 PROCEDURE — 85027 COMPLETE CBC AUTOMATED: CPT

## 2023-03-11 PROCEDURE — 73080 X-RAY EXAM OF ELBOW: CPT

## 2023-03-11 PROCEDURE — 84550 ASSAY OF BLOOD/URIC ACID: CPT

## 2023-03-11 PROCEDURE — 6360000002 HC RX W HCPCS: Performed by: INTERNAL MEDICINE

## 2023-03-11 PROCEDURE — 6370000000 HC RX 637 (ALT 250 FOR IP): Performed by: INTERNAL MEDICINE

## 2023-03-11 PROCEDURE — 36415 COLL VENOUS BLD VENIPUNCTURE: CPT

## 2023-03-11 PROCEDURE — 93010 ELECTROCARDIOGRAM REPORT: CPT | Performed by: INTERNAL MEDICINE

## 2023-03-11 PROCEDURE — C8929 TTE W OR WO FOL WCON,DOPPLER: HCPCS

## 2023-03-11 PROCEDURE — 96374 THER/PROPH/DIAG INJ IV PUSH: CPT

## 2023-03-11 PROCEDURE — 99223 1ST HOSP IP/OBS HIGH 75: CPT | Performed by: INTERNAL MEDICINE

## 2023-03-11 RX ORDER — INDOMETHACIN 25 MG/1
50 CAPSULE ORAL
Status: DISCONTINUED | OUTPATIENT
Start: 2023-03-12 | End: 2023-03-14 | Stop reason: HOSPADM

## 2023-03-11 RX ORDER — METHYLPREDNISOLONE SODIUM SUCCINATE 40 MG/ML
40 INJECTION, POWDER, LYOPHILIZED, FOR SOLUTION INTRAMUSCULAR; INTRAVENOUS ONCE
Status: COMPLETED | OUTPATIENT
Start: 2023-03-11 | End: 2023-03-11

## 2023-03-11 RX ADMIN — ATORVASTATIN CALCIUM 80 MG: 80 TABLET, FILM COATED ORAL at 21:04

## 2023-03-11 RX ADMIN — ALLOPURINOL 300 MG: 300 TABLET ORAL at 08:15

## 2023-03-11 RX ADMIN — PERFLUTREN 1.5 ML: 6.52 INJECTION, SUSPENSION INTRAVENOUS at 09:13

## 2023-03-11 RX ADMIN — COLCHICINE 0.6 MG: 0.6 TABLET ORAL at 08:15

## 2023-03-11 RX ADMIN — COLCHICINE 0.6 MG: 0.6 TABLET ORAL at 21:04

## 2023-03-11 RX ADMIN — Medication 10 ML: at 08:17

## 2023-03-11 RX ADMIN — METHYLPREDNISOLONE SODIUM SUCCINATE 40 MG: 40 INJECTION, POWDER, FOR SOLUTION INTRAMUSCULAR; INTRAVENOUS at 17:16

## 2023-03-11 RX ADMIN — ASPIRIN 81 MG CHEWABLE TABLET 81 MG: 81 TABLET CHEWABLE at 08:15

## 2023-03-11 RX ADMIN — ENOXAPARIN SODIUM 60 MG: 100 INJECTION SUBCUTANEOUS at 08:15

## 2023-03-11 RX ADMIN — ENOXAPARIN SODIUM 60 MG: 100 INJECTION SUBCUTANEOUS at 21:04

## 2023-03-11 ASSESSMENT — PAIN SCALES - GENERAL
PAINLEVEL_OUTOF10: 0
PAINLEVEL_OUTOF10: 6
PAINLEVEL_OUTOF10: 8

## 2023-03-11 ASSESSMENT — PAIN DESCRIPTION - ORIENTATION: ORIENTATION: RIGHT

## 2023-03-11 ASSESSMENT — PAIN DESCRIPTION - LOCATION: LOCATION: ARM;ELBOW

## 2023-03-11 NOTE — PROGRESS NOTES
Admitted patient to room 4276 from the Emergency Room with pain radiating from chest to right arm. Patient states 7 out of 10 pain. VS recorded (see flowsheet). Patient's breathing regular and unlabored. Oriented to room, call light, TV, phone, patient rights and responsibilities. Bed in lowest position and locked. Non-slip socks on. ID bracelet on and correct per pt verbally reporting name and date of birth. Call light within reach. Needed items within reach.  Electronically signed by Zeeshan Zendejas RN on 3/10/2023 at 8:21 PM

## 2023-03-11 NOTE — PROGRESS NOTES
Discussed with patient mother and discussed all the results and updates, she wishes to talk to cardiologist. I consulted ENT for patient complain of ear pain

## 2023-03-11 NOTE — PLAN OF CARE
Problem: Discharge Planning  Goal: Discharge to home or other facility with appropriate resources  3/11/2023 1056 by Yennifer Cardenas RN  Outcome: Progressing  3/10/2023 2212 by Bryn Soto RN  Outcome: Progressing  Flowsheets (Taken 3/10/2023 2013)  Discharge to home or other facility with appropriate resources: Identify barriers to discharge with patient and caregiver     Problem: Pain  Goal: Verbalizes/displays adequate comfort level or baseline comfort level  3/11/2023 1056 by Yennifer Cardenas RN  Outcome: Progressing  3/10/2023 2212 by Bryn Soto RN  Outcome: Progressing  Flowsheets (Taken 3/10/2023 2006)  Verbalizes/displays adequate comfort level or baseline comfort level: Encourage patient to monitor pain and request assistance     Problem: Safety - Adult  Goal: Free from fall injury  3/11/2023 1056 by Yennifer Cardenas RN  Outcome: Progressing  3/10/2023 2212 by Bryn Soto RN  Outcome: Progressing     Problem: ABCDS Injury Assessment  Goal: Absence of physical injury  3/11/2023 1056 by Yennifer Cardenas RN  Outcome: Progressing  3/10/2023 2212 by Bryn Soto RN  Outcome: Progressing     Problem: Neurosensory - Adult  Goal: Achieves maximal functionality and self care  3/11/2023 1056 by Yennifer Cardenas RN  Outcome: Progressing  3/10/2023 2212 by Bryn Soto RN  Outcome: Progressing  Flowsheets (Taken 3/10/2023 2015)  Achieves maximal functionality and self care: Monitor swallowing and airway patency with patient fatigue and changes in neurological status     Problem: Respiratory - Adult  Goal: Achieves optimal ventilation and oxygenation  3/11/2023 1056 by Yennifer Cardenas RN  Outcome: Progressing  3/10/2023 2212 by Bryn Soto RN  Outcome: Progressing  Flowsheets (Taken 3/10/2023 2015)  Achieves optimal ventilation and oxygenation: Assess for changes in respiratory status     Problem: Cardiovascular - Adult  Goal: Maintains optimal cardiac output and hemodynamic stability  3/11/2023 1056 by Shona Briseno RN  Outcome: Progressing  3/10/2023 2212 by Indy Dixon RN  Outcome: Progressing  Flowsheets (Taken 3/10/2023 2015)  Maintains optimal cardiac output and hemodynamic stability: Monitor blood pressure and heart rate  Goal: Absence of cardiac dysrhythmias or at baseline  3/11/2023 1056 by Shona Briseno RN  Outcome: Progressing  3/10/2023 2212 by Indy Dixon RN  Outcome: Progressing  Flowsheets (Taken 3/10/2023 2015)  Absence of cardiac dysrhythmias or at baseline: Monitor cardiac rate and rhythm

## 2023-03-11 NOTE — PROGRESS NOTES
Patient stated 7 out of 10 right arm and chest pain. Followed PRN pain management protocol. See MAR.  Electronically signed by Kashmir Bell RN on 3/10/2023 at 9:59 PM

## 2023-03-11 NOTE — H&P
Hospital Medicine History & Physical      PCP: No primary care provider on file. Date of Admission: 3/10/2023    Chief Complaint:    Chief Complaint   Patient presents with    Chest Pain     Chest pain 7/10 pressure like pain midsternal and radiates to rt arm. No reported personal hx of heart issues but family hx. Loss of Consciousness     Pt reports passing out at work. States he did not hit his head but did have loc. Pt states he slumped to the ground. Otalgia     Rt ear pain, hx of ear infection. Nausea     Reports nausea with cp. Denies vomiting. Arm Pain     Pt states rt arm pain, stated he may have hurt it when he passed out. History Of Present Illness:    Patient is a 72-year-old male who presented to hospital due to chest pain. According to patient he had chest pain today followed by loss of consciousness, he also mentions he had associated lightheadedness episode. He has a strong family history of CAD. Patient otherwise denied shortness of breath nausea vomiting diarrhea constipation dysuria fevers chills. He had associated nausea. He also reported right arm pain      Past Medical History:          Diagnosis Date    Kidney disorder     patient has a horseshoe shaped kidney       Past Surgical History:          Procedure Laterality Date    EYE SURGERY  2000       Medications Prior to Admission:      Prior to Admission medications    Medication Sig Start Date End Date Taking?  Authorizing Provider   allopurinol (ZYLOPRIM) 300 MG tablet Take 300 mg by mouth daily 8/24/21  Yes Historical Provider, MD   colchicine (COLCRYS) 0.6 MG tablet Take 0.6 mg by mouth 2 times daily 8/17/22  Yes Historical Provider, MD   tiZANidine (ZANAFLEX) 4 MG tablet Take 4 mg by mouth 3 times daily as needed 12/17/21  Yes Historical Provider, MD   indomethacin (INDOCIN) 50 MG capsule Take 50 mg by mouth 3 times daily as needed for Pain    Historical Provider, MD       Allergies:  Augmentin [amoxicillin-pot clavulanate]    Social History:      TOBACCO:   reports that he has been smoking. He has never used smokeless tobacco.  ETOH:   reports current alcohol use. Family History:       Reviewed in detail and non contributory      No family history on file. REVIEW OF SYSTEMS:   Pertinent positives as noted in the HPI. All other systems reviewed and negative. PHYSICAL EXAM PERFORMED:    /88   Pulse 95   Temp 98.6 °F (37 °C) (Oral)   Resp 24   Ht 5' 11\" (1.803 m)   Wt (!) 413 lb 9.3 oz (187.6 kg)   SpO2 99%   BMI 57.68 kg/m²     General appearance:  No apparent distress, cooperative. HEENT:  Normal cephalic, atraumatic without obvious deformity. Conjunctivae/corneas clear. Neck: Supple, with full range of motion. No cervical lymphadenopathy  Respiratory:  Normal respiratory effort. Clear to auscultation, bilaterally without Rales/Wheezes/Rhonchi. Cardiovascular:  Regular rate and rhythm with normal S1/S2 without murmurs, rubs or gallops. Abdomen: Soft, non-tender, non-distended, normal bowel sounds. Musculoskeletal:  No edema noted bilaterally. No tenderness on palpation   Skin: no rash visible  Neurologic:  Neurologically intact without any focal sensory/motor deficits. grossly non-focal.  Psychiatric:  Alert and oriented, normal mood  Peripheral Pulses: +2 palpable, equal bilaterally       Labs:     Recent Labs     03/10/23  1402   WBC 10.2   HGB 14.6   HCT 43.6        Recent Labs     03/10/23  1402   *   K 3.9      CO2 22   BUN 12   CREATININE 0.8*   CALCIUM 9.5     Recent Labs     03/10/23  1402   AST 16   ALT 28   BILITOT 0.8   ALKPHOS 81     No results for input(s): INR in the last 72 hours.   Recent Labs     03/10/23  1402 03/10/23  1807   TROPONINI <0.01 <0.01       Urinalysis:    No results found for: Justinat Billisrael, BACTERIA, RBCUA, BLOODU, SPECGRAV, GLUCOSEU    Radiology:       CT CHEST PULMONARY EMBOLISM W CONTRAST   Final Result   No evidence of pulmonary embolism or acute pulmonary abnormality. XR CHEST (2 VW)   Final Result   Haziness in the lungs could be partly from patient's body habitus versus   minimal congestion. No focal consolidation. No pneumothorax. Active Hospital Problems    Diagnosis Date Noted    Chest pain [R07.9] 03/10/2023     Priority: Medium         Patient is a 79-year-old male who presented to hospital due to chest pain. According to patient he had chest pain today followed by loss of consciousness, he also mentions he had associated lightheadedness episode. He has a strong family history of CAD. Patient otherwise denied shortness of breath nausea vomiting diarrhea constipation dysuria fevers chills. He had associated nausea. He also reported right arm pain    Assessment  Chest pain rule out ACS  Syncope  Right arm pain  Morbid obesity  Lactic acidosis  Hyponatremia  Elevated D-dimer    Plan  CTA chest was performed in ED-negative for DVT  Check orthostatic vitals  Monitor on cardiac telemetry  Monitor troponin  Cardiology consulted from ED, will defer ordering stress test to cardiology  Check echocardiogram  Monitor and replace electrolytes  Aspirin, statin  Resume home medications  DVT prophylaxis-Lovenox  Diet: ADULT DIET; Clear Liquid; No Caffeine  Code Status: Full Code    PT/OT Eval Status: ordered    Dispo - pending clinical improvement       Francisca Sanchez MD    The note was completed using EMR and Dragon dictation system. Every effort was made to ensure accuracy; however, inadvertent computerized transcription errors may be present. Thank you No primary care provider on file. for the opportunity to be involved in this patient's care. If you have any questions or concerns please feel free to contact me at [phone].     Francisca Sanchez MD

## 2023-03-11 NOTE — PROGRESS NOTES
Hospitalist Progress Note      PCP: No primary care provider on file. Chief Complaint. Patient is a 24-year-old male who presented to hospital due to chest pain. According to patient he had chest pain today followed by loss of consciousness, he also mentions he had associated lightheadedness episode. He has a strong family history of CAD. Patient otherwise denied shortness of breath nausea vomiting diarrhea constipation dysuria fevers chills. He had associated nausea. He also reported right arm pain    Date of Admission: 3/10/2023      Subjective:   denies chest pain, nausea, vomiting, shortness of breath, fever or chills. mention feels overall better    Medications:  Reviewed    Infusion Medications    sodium chloride       Scheduled Medications    [START ON 3/12/2023] indomethacin  50 mg Oral TID WC    sodium chloride flush  5-40 mL IntraVENous 2 times per day    aspirin  81 mg Oral Daily    atorvastatin  80 mg Oral Nightly    enoxaparin  60 mg SubCUTAneous BID    allopurinol  300 mg Oral Daily    colchicine  0.6 mg Oral BID     PRN Meds: sodium chloride flush, sodium chloride, ondansetron **OR** ondansetron, acetaminophen **OR** acetaminophen, polyethylene glycol, tiZANidine      Intake/Output Summary (Last 24 hours) at 3/11/2023 1740  Last data filed at 3/10/2023 2318  Gross per 24 hour   Intake --   Output 850 ml   Net -850 ml       Physical Exam Performed:    /69   Pulse (!) 111   Temp 97.4 °F (36.3 °C) (Oral)   Resp 18   Ht 5' 11\" (1.803 m)   Wt (!) 413 lb 9.3 oz (187.6 kg)   SpO2 92%   BMI 57.68 kg/m²     General appearance: No apparent distress,   HEENT:  Conjunctivae/corneas clear. Neck: Supple, with full range of motion. Respiratory:  Normal respiratory effort. Clear to auscultation, bilaterally without Rales/Wheezes/Rhonchi.   Cardiovascular: Regular rate and rhythm with normal S1/S2 without murmurs or rubs  Abdomen: Soft, non-tender, non-distended, normal bowel sounds. Musculoskeletal: R elbow tender to touch, No cyanosis or edema bilaterally  Neurologic:  without any focal sensory/motor deficits. grossly non-focal.  Psychiatric: Alert and oriented, Normal mood  Peripheral Pulses: +2 palpable, equal bilaterally       Labs:   Recent Labs     03/10/23  1402 03/11/23  0750   WBC 10.2 8.4   HGB 14.6 13.5   HCT 43.6 41.3    265     Recent Labs     03/10/23  1402   *   K 3.9      CO2 22   BUN 12   CREATININE 0.8*   CALCIUM 9.5     Recent Labs     03/10/23  1402   AST 16   ALT 28   BILITOT 0.8   ALKPHOS 81     No results for input(s): INR in the last 72 hours. Recent Labs     03/10/23  1807 03/10/23  2030 03/10/23  2319   TROPONINI <0.01 <0.01 <0.01       Urinalysis:    No results found for: Eliane Jose, BACTERIA, RBCUA, BLOODU, SPECGRAV, GLUCOSEU    Radiology:  XR ELBOW RIGHT (MIN 3 VIEWS)   Final Result   Posterior soft tissue swelling, probably olecranon bursitis, with additional   edema extending both proximally and distally. Focal lucencies within the posterior soft tissues at the level of the mid   distal humerus, medial unclear. These could potentially be superficial the   patient, but soft tissue gas must be considered as well. If there is any   evidence of necrotizing infection, CT would be recommended. CT CHEST PULMONARY EMBOLISM W CONTRAST   Final Result   No evidence of pulmonary embolism or acute pulmonary abnormality. XR CHEST (2 VW)   Final Result   Haziness in the lungs could be partly from patient's body habitus versus   minimal congestion. No focal consolidation. No pneumothorax. Assessment/Plan:    Active Hospital Problems    Diagnosis     Chest pain [R07.9]      Priority: Medium     Patient is a 42-year-old male who presented to hospital due to chest pain. According to patient he had chest pain today followed by loss of consciousness, he also mentions he had associated lightheadedness episode.   He has a strong family history of CAD. Patient otherwise denied shortness of breath nausea vomiting diarrhea constipation dysuria fevers chills. He had associated nausea. He also reported right arm pain     Assessment  Chest pain rule out ACS  Syncope  R elbow pain - due to bursitis, Gout  R ear pain  Right arm pain  Morbid obesity  Lactic acidosis  Hyponatremia  Elevated D-dimer     Plan  Uric acid high, ordered Indomethacin, 1 x solumedrol, continue Home colchicine  CTA chest was performed in ED-negative for PE  Check orthostatic vitals  Monitor on cardiac telemetry  Cardiology do not think needs stress test, patient and mother adamant about getting test  Monitor troponin - negative x 3  Cardiology consulted from ED, will defer ordering stress test to cardiology  Check echocardiogram - unremarkable for RWMA, EF 55%  Monitor and replace electrolytes  Aspirin, statin  Resume home medications  DVT prophylaxis-Lovenox  Diet: ADULT DIET; Regular;  No Caffeine  Code Status: Full Code    PT/OT Eval Status: ordered    Dispo/Plan of care -  Cardiology do not think needs stress test, patient and mother adamant about getting test, explained to mother, but Mother would like to talk to Cardiology  ENT consulted for R ear pain    Tony Licea MD

## 2023-03-11 NOTE — PROGRESS NOTES
4 Eyes Skin Assessment     NAME:  Romero Haynes  YOB: 1990  MEDICAL RECORD NUMBER:  [id number]    The patient is being assessed for  Admission    I agree that One RN has performed a thorough Head to Toe Skin Assessment on the patient. ALL assessment sites listed below have been assessed.      Areas assessed by both nurses:    Head, Face, Ears, Shoulders, Back, Chest, Arms, Elbows, Hands, Sacrum. Buttock, Coccyx, Ischium, and Legs. Feet and Heels        Does the Patient have a Wound? No noted wound(s)       Lucio Prevention initiated by RN: No   Wound Care Orders initiated by RN: No    Pressure Injury (Stage 3,4, Unstageable, DTI, NWPT, and Complex wounds) if present, place referral order by RN under : No    New and Established Ostomies, if present place, referral order under : No      Nurse 1 eSignature: Electronically signed by Alison De Luna RN on 3/10/23 at 10:08 PM EST    **SHARE this note so that the co-signing nurse can place an eSignature**    Nurse 2 eSignature: Electronically signed by Italia Marsh RN on 3/10/23 at 10:09 PM EST

## 2023-03-11 NOTE — PLAN OF CARE
Problem: Discharge Planning  Goal: Discharge to home or other facility with appropriate resources  Outcome: Progressing  Flowsheets (Taken 3/10/2023 2013)  Discharge to home or other facility with appropriate resources: Identify barriers to discharge with patient and caregiver     Problem: Pain  Goal: Verbalizes/displays adequate comfort level or baseline comfort level  Outcome: Progressing  Flowsheets (Taken 3/10/2023 2006)  Verbalizes/displays adequate comfort level or baseline comfort level: Encourage patient to monitor pain and request assistance     Problem: Safety - Adult  Goal: Free from fall injury  Outcome: Progressing     Problem: ABCDS Injury Assessment  Goal: Absence of physical injury  Outcome: Progressing     Problem: Neurosensory - Adult  Goal: Achieves maximal functionality and self care  Outcome: Progressing  Flowsheets (Taken 3/10/2023 2015)  Achieves maximal functionality and self care: Monitor swallowing and airway patency with patient fatigue and changes in neurological status     Problem: Respiratory - Adult  Goal: Achieves optimal ventilation and oxygenation  Outcome: Progressing  Flowsheets (Taken 3/10/2023 2015)  Achieves optimal ventilation and oxygenation: Assess for changes in respiratory status     Problem: Cardiovascular - Adult  Goal: Maintains optimal cardiac output and hemodynamic stability  Outcome: Progressing  Flowsheets (Taken 3/10/2023 2015)  Maintains optimal cardiac output and hemodynamic stability: Monitor blood pressure and heart rate  Goal: Absence of cardiac dysrhythmias or at baseline  Outcome: Progressing  Flowsheets (Taken 3/10/2023 2015)  Absence of cardiac dysrhythmias or at baseline: Monitor cardiac rate and rhythm     Problem: Skin/Tissue Integrity - Adult  Goal: Skin integrity remains intact  Outcome: Progressing  Flowsheets (Taken 3/10/2023 2015)  Skin Integrity Remains Intact: Monitor for areas of redness and/or skin breakdown     Problem: Musculoskeletal - Adult  Goal: Return mobility to safest level of function  Outcome: Progressing  Flowsheets (Taken 3/10/2023 2015)  Return Mobility to Safest Level of Function: Assess patient stability and activity tolerance for standing, transferring and ambulating with or without assistive devices     Problem: Genitourinary - Adult  Goal: Absence of urinary retention  Outcome: Progressing  Flowsheets (Taken 3/10/2023 2015)  Absence of urinary retention: Assess patients ability to void and empty bladder     Problem: Infection - Adult  Goal: Absence of infection at discharge  Outcome: Progressing  Flowsheets (Taken 3/10/2023 2015)  Absence of infection at discharge: Assess and monitor for signs and symptoms of infection

## 2023-03-11 NOTE — CONSULTS
Cardiology Consultation   Date: 3/11/2023  Admit Date:  3/10/2023  Reason for Consultation: CP/syncope  Consult Requesting Physician: Jonh Miller MD     Chief Complaint   Patient presents with    Chest Pain     Chest pain 7/10 pressure like pain midsternal and radiates to rt arm. No reported personal hx of heart issues but family hx. Loss of Consciousness     Pt reports passing out at work. States he did not hit his head but did have loc. Pt states he slumped to the ground. Otalgia     Rt ear pain, hx of ear infection. Nausea     Reports nausea with cp. Denies vomiting. Arm Pain     Pt states rt arm pain, stated he may have hurt it when he passed out. HPI: Rach Sheets is a 34 y.o. who presented to hospital after a syncopal episode. The duration of the syncopal episode was uncertain but he states that it had to of been brief. 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 He started experiencing chest pain that radiated across his chest to his right arm. States that was pressure-like in nature, that is when he grew lightheaded and passed out. He states that he does not think he had hit his head just slumped down. Hence no headache, diplopia or visual changes. Has been nauseous but no vomiting or diarrhea. No personal significant medical history other than gout and morbid obesity. Significant CAD family history. No history in the family of DVT or PE. Came to the ED with his mother for further evaluation and treatment. Troponin negative x3. EKG shows NSR, with no ischemic changes. Past Medical History:   Diagnosis Date    Kidney disorder     patient has a horseshoe shaped kidney        Past Surgical History:   Procedure Laterality Date    EYE SURGERY  2000       Allergies   Allergen Reactions    Augmentin [Amoxicillin-Pot Clavulanate] Nausea Only       Social History:  Reviewed. reports that he has been smoking.  He has never used smokeless tobacco. He reports current alcohol use. He reports current drug use. Drug: Marijuana Angella Palm). Family History:  Reviewed. family history is not on file. No premature CAD. Review of System:  Pertinent positive and negatives are in the HPI, the rest are negative. Physical Examination:  Vitals:    23 0734   BP: 136/89   Pulse: 96   Resp: 18   Temp: 98.1 °F (36.7 °C)   SpO2: 93%        Intake/Output Summary (Last 24 hours) at 3/11/2023 0831  Last data filed at 3/10/2023 2318  Gross per 24 hour   Intake --   Output 850 ml   Net -850 ml     In: -   Out: 850    Wt Readings from Last 3 Encounters:   23 (!) 413 lb 9.3 oz (187.6 kg)   20 (!) 325 lb (147.4 kg)     Temp  Av.2 °F (36.8 °C)  Min: 98 °F (36.7 °C)  Max: 98.6 °F (37 °C)  Pulse  Av.9  Min: 55  Max: 106  BP  Min: 131/87  Max: 178/99  SpO2  Av.5 %  Min: 93 %  Max: 100 %    Telemetry: Sinus rhythm   Constitutional: Alert. Oriented to person, place, and time. No distress. Head: Normocephalic and atraumatic. Mouth/Throat: Lips appear moist. Oropharynx is clear and moist.  Eyes: Conjunctivae normal. EOM are normal.   Neck: Neck supple. No rigidity. no JVD present. Cardiovascular: Normal rate, regular rhythm. Normal S1&S2. .    Pulmonary/Chest: Bilateral respiratory sounds present. No respiratory accessory muscle use. No wheezes, No rhonchi. no rales. Abdominal: Soft. Normal bowel sounds present. No tenderness. Musculoskeletal: No tenderness. No pitting edema    Lymphadenopathy: Has no obvious cervical adenopathy. Neurological: Alert and oriented. Grossly intact with no obvious focal deficit. Skin: Skin is warm and dry. Psychiatric: Normal mood and affect. Labs:  Reviewed.    Recent Labs     03/10/23  1402   *   K 3.9      CO2 22   BUN 12   CREATININE 0.8*     Recent Labs     03/10/23  1402   WBC 10.2   HGB 14.6   HCT 43.6   MCV 85.2        Lab Results   Component Value Date/Time    TROPONINI <0.01 03/10/2023 11:19 PM     No results found for: BNP  No results found for: PROTIME, INR  No results found for: CHOL, HDL, TRIG    Diagnostic and imaging results reviewed. ECG: 3/10/23: NSR, no ischemic changes. Echo: 3/10/23  Conclusions      Summary   Left ventricular cavity size is normal. Left ventricular function is normal,   LV ejection fraction estimated to be 95%   Normal diastolic function   The right ventricle is normal in size and function. Estimated pulmonary artery systolic pressure is at 27 mmHg assuming a right   atrial pressure of 8 mmHg. No clinically significant valvular heart disease    I independently reviewed the ECG, telemetry, serology, echocardiographic results. Scheduled Meds:   sodium chloride flush  5-40 mL IntraVENous 2 times per day    aspirin  81 mg Oral Daily    atorvastatin  80 mg Oral Nightly    enoxaparin  60 mg SubCUTAneous BID    allopurinol  300 mg Oral Daily    colchicine  0.6 mg Oral BID     Continuous Infusions:   sodium chloride       PRN Meds:.sodium chloride flush, sodium chloride, ondansetron **OR** ondansetron, acetaminophen **OR** acetaminophen, polyethylene glycol, indomethacin, tiZANidine, perflutren lipid microspheres     Problem List:   Patient Active Problem List    Diagnosis Date Noted    Chest pain 03/10/2023      Active Hospital Problems    Diagnosis Date Noted    Chest pain [R07.9] 03/10/2023     Priority: Medium         Assessment and Recommendation(s):  Chest pain  In the setting of lifting very heavy boxes. No ischemic changes on ECG and troponin is negative x3. Atypical in nature. Get echocardiogram.  Syncope  Prodrome of lightheadedness. Likely vasovagal. Has nocturnal bradycardia with upto 2.8 second pause. Needs a sleep study. Suspect untreated BRAIN. CTPE negative and no acute pulmonary abnormality. 30 day event monitor upon discharge. Obesity. Lifestyle modification. Encouraged weight loss. Gout  Per primary care.     Thank you for allowing me to participate in the care of Anshu Estrella . If you have any questions/comments, please do not hesitate to contact us.       Patrick Dove MD  Cardiac Electrophysiology  [facility]

## 2023-03-12 PROBLEM — R55 VASOVAGAL SYNCOPE: Status: ACTIVE | Noted: 2023-03-12

## 2023-03-12 LAB
EKG ATRIAL RATE: 100 BPM
EKG DIAGNOSIS: NORMAL
EKG P AXIS: 44 DEGREES
EKG P-R INTERVAL: 140 MS
EKG Q-T INTERVAL: 374 MS
EKG QRS DURATION: 92 MS
EKG QTC CALCULATION (BAZETT): 482 MS
EKG R AXIS: 37 DEGREES
EKG T AXIS: 42 DEGREES
EKG VENTRICULAR RATE: 100 BPM

## 2023-03-12 PROCEDURE — 6370000000 HC RX 637 (ALT 250 FOR IP): Performed by: INTERNAL MEDICINE

## 2023-03-12 PROCEDURE — 6360000002 HC RX W HCPCS: Performed by: INTERNAL MEDICINE

## 2023-03-12 PROCEDURE — 2580000003 HC RX 258: Performed by: INTERNAL MEDICINE

## 2023-03-12 PROCEDURE — 1200000000 HC SEMI PRIVATE

## 2023-03-12 PROCEDURE — 99233 SBSQ HOSP IP/OBS HIGH 50: CPT | Performed by: NURSE PRACTITIONER

## 2023-03-12 PROCEDURE — 99253 IP/OBS CNSLTJ NEW/EST LOW 45: CPT | Performed by: OTOLARYNGOLOGY

## 2023-03-12 PROCEDURE — G0378 HOSPITAL OBSERVATION PER HR: HCPCS

## 2023-03-12 PROCEDURE — 96372 THER/PROPH/DIAG INJ SC/IM: CPT

## 2023-03-12 PROCEDURE — 6370000000 HC RX 637 (ALT 250 FOR IP): Performed by: OTOLARYNGOLOGY

## 2023-03-12 RX ORDER — CIPROFLOXACIN AND DEXAMETHASONE 3; 1 MG/ML; MG/ML
4 SUSPENSION/ DROPS AURICULAR (OTIC) 2 TIMES DAILY
Status: DISCONTINUED | OUTPATIENT
Start: 2023-03-12 | End: 2023-03-12 | Stop reason: CLARIF

## 2023-03-12 RX ORDER — DEXAMETHASONE SODIUM PHOSPHATE 1 MG/ML
4 SOLUTION/ DROPS OPHTHALMIC 2 TIMES DAILY
Status: DISCONTINUED | OUTPATIENT
Start: 2023-03-12 | End: 2023-03-14 | Stop reason: HOSPADM

## 2023-03-12 RX ORDER — CIPROFLOXACIN HYDROCHLORIDE 3.5 MG/ML
4 SOLUTION/ DROPS TOPICAL 2 TIMES DAILY
Status: DISCONTINUED | OUTPATIENT
Start: 2023-03-12 | End: 2023-03-14 | Stop reason: HOSPADM

## 2023-03-12 RX ADMIN — INDOMETHACIN 50 MG: 25 CAPSULE ORAL at 09:40

## 2023-03-12 RX ADMIN — DEXAMETHASONE SODIUM PHOSPHATE 4 DROP: 1 SOLUTION/ DROPS OPHTHALMIC at 13:22

## 2023-03-12 RX ADMIN — INDOMETHACIN 50 MG: 25 CAPSULE ORAL at 12:08

## 2023-03-12 RX ADMIN — TIZANIDINE 4 MG: 4 TABLET ORAL at 12:11

## 2023-03-12 RX ADMIN — ALLOPURINOL 300 MG: 300 TABLET ORAL at 09:42

## 2023-03-12 RX ADMIN — Medication 10 ML: at 21:29

## 2023-03-12 RX ADMIN — DEXAMETHASONE SODIUM PHOSPHATE 4 DROP: 1 SOLUTION/ DROPS OPHTHALMIC at 21:28

## 2023-03-12 RX ADMIN — Medication 10 ML: at 09:42

## 2023-03-12 RX ADMIN — ATORVASTATIN CALCIUM 80 MG: 80 TABLET, FILM COATED ORAL at 21:27

## 2023-03-12 RX ADMIN — ENOXAPARIN SODIUM 60 MG: 100 INJECTION SUBCUTANEOUS at 21:27

## 2023-03-12 RX ADMIN — COLCHICINE 0.6 MG: 0.6 TABLET ORAL at 09:42

## 2023-03-12 RX ADMIN — INDOMETHACIN 50 MG: 25 CAPSULE ORAL at 16:23

## 2023-03-12 RX ADMIN — CIPROFLOXACIN 4 DROP: 3 SOLUTION OPHTHALMIC at 13:22

## 2023-03-12 RX ADMIN — CIPROFLOXACIN 4 DROP: 3 SOLUTION OPHTHALMIC at 21:28

## 2023-03-12 RX ADMIN — ASPIRIN 81 MG CHEWABLE TABLET 81 MG: 81 TABLET CHEWABLE at 09:42

## 2023-03-12 RX ADMIN — ENOXAPARIN SODIUM 60 MG: 100 INJECTION SUBCUTANEOUS at 09:42

## 2023-03-12 RX ADMIN — COLCHICINE 0.6 MG: 0.6 TABLET ORAL at 21:27

## 2023-03-12 ASSESSMENT — PAIN DESCRIPTION - DESCRIPTORS
DESCRIPTORS: DISCOMFORT
DESCRIPTORS: DISCOMFORT;SORE
DESCRIPTORS: DISCOMFORT;SORE

## 2023-03-12 ASSESSMENT — PAIN DESCRIPTION - LOCATION
LOCATION: SHOULDER;CHEST
LOCATION: CHEST;SHOULDER
LOCATION: CHEST;SHOULDER

## 2023-03-12 ASSESSMENT — PAIN DESCRIPTION - ORIENTATION
ORIENTATION: RIGHT

## 2023-03-12 ASSESSMENT — PAIN SCALES - GENERAL
PAINLEVEL_OUTOF10: 5
PAINLEVEL_OUTOF10: 2
PAINLEVEL_OUTOF10: 4
PAINLEVEL_OUTOF10: 2

## 2023-03-12 ASSESSMENT — PAIN - FUNCTIONAL ASSESSMENT: PAIN_FUNCTIONAL_ASSESSMENT: PREVENTS OR INTERFERES SOME ACTIVE ACTIVITIES AND ADLS

## 2023-03-12 ASSESSMENT — PAIN DESCRIPTION - PAIN TYPE: TYPE: ACUTE PAIN

## 2023-03-12 NOTE — PLAN OF CARE
Problem: Discharge Planning  Goal: Discharge to home or other facility with appropriate resources  Outcome: Progressing     Problem: Pain  Goal: Verbalizes/displays adequate comfort level or baseline comfort level  Outcome: Progressing     Problem: Safety - Adult  Goal: Free from fall injury  Outcome: Progressing     Problem: Neurosensory - Adult  Goal: Achieves maximal functionality and self care  Outcome: Progressing     Problem: Cardiovascular - Adult  Goal: Maintains optimal cardiac output and hemodynamic stability  Outcome: Progressing

## 2023-03-12 NOTE — CONSULTS
Valeria      Patient Name: [id number] [facility] Record Number:  [id number]  Primary Care Physician:  No primary care provider on file. Date of Consultation: 3/12/2023    Chief Complaint: right ear pain    HISTORY OF PRESENT ILLNESS  Chad Alcaraz is a(n) 34 y.o. male admitted for chest pain complaining of right ear pain. Reports right muffled hearing and purulent drainage that started several weeks ago, seen at urgent care and treated with oral abx. Reports finished abx several day ago but persistent right ear pain and drainage. History of PE tube placement 10 + years ago, right remains in place, left TM with perforation. Patient Active Problem List   Diagnosis    Chest pain     Past Surgical History:   Procedure Laterality Date    EYE SURGERY  2000     No family history on file. Social History     Tobacco Use    Smoking status: Some Days    Smokeless tobacco: Never   Vaping Use    Vaping Use: Never used   Substance Use Topics    Alcohol use: Yes     Comment: occasional    Drug use: Yes     Types: Marijuana Garon Chinchilla)     Comment: yesterday evening       DRUG/FOOD ALLERGIES: Augmentin [amoxicillin-pot clavulanate]    CURRENT MEDICATIONS  Prior to Admission medications    Medication Sig Start Date End Date Taking?  Authorizing Provider   allopurinol (ZYLOPRIM) 300 MG tablet Take 300 mg by mouth daily 8/24/21  Yes Historical Provider, MD   colchicine (COLCRYS) 0.6 MG tablet Take 0.6 mg by mouth 2 times daily 8/17/22  Yes Historical Provider, MD   tiZANidine (ZANAFLEX) 4 MG tablet Take 4 mg by mouth 3 times daily as needed 12/17/21  Yes Historical Provider, MD   indomethacin (INDOCIN) 50 MG capsule Take 50 mg by mouth 3 times daily as needed for Pain    Historical Provider, MD       REVIEW OF SYSTEMS  The following systems were reviewed and revealed the following in addition to any already discussed in the HPI:    CONSTITUTIONAL: denies weight loss, no fever, no night sweats, no chills  EYES: no vision changes, no blurry vision  EARS: no changes in hearing, +right otalgia  NOSE: no epistaxis, no rhinorrhea  RESPIRATORY: no difficulty breathing, no shortness of breath  CV: no chest pain, no palpitations  HEME: No coagulation disorder, no bleeding disorder  NEURO: no TIA or stroke-like symptoms  SKIN: No new rashes in the head and neck, no recent skin cancers  MOUTH: No new ulcers, no recent teeth infections  GASTROINTESTINAL: No diarrhea, stomach pain  PSYCH: No anxiety, no depression      PHYSICAL EXAM  BP (!) 168/96   Pulse 88   Temp 97.9 °F (36.6 °C) (Oral)   Resp 16   Ht 5' 11\" (1.803 m)   Wt (!) 415 lb 5.6 oz (188.4 kg)   SpO2 98%   BMI 57.93 kg/m²     GENERAL: No Acute Distress, Alert and Oriented, no hoarseness  EYES: EOMI, Anti-icteric  NOSE: No epistaxis, nasal mucosa within normal limits, no purulent drainage  EARS: Normal external canal appearance, EAC patent bilaterally, right PE tube with purulent otorrhea, left TM with posterior inferior perforation  FACE: 1/6 House-Brackmann Scale, symmetric, sensation equal bilaterally  ORAL CAVITY: No masses or lesions palpated, uvula is midline, moist mucous membranes  NECK: Normal range of motion, no thyromegaly, trachea is midline, no lymphadenopathy, no neck masses, no crepitus  CHEST: Normal respiratory effort, no retractions, breathing comfortably  SKIN: No rashes, normal appearing skin, no evidence of skin lesions/tumors    RADIOLOGY  Summary of findings:  N/a    PROCEDURE  N/a    ASSESSMENT/PLAN  Reny Schmidt is a very pleasant 34 y.o. male with right acute otitis media, left tympanic membrane perforation  -PE in place with purulent drainage  -PE tube placed 10+ years ago per patient  -ciprodex otic right ear 4 drops BID x 7 days  -follow up with [facility] to establish    Please contact with further questions or concerns    Elias Bradley, DO  Otolaryngology    Medical Decision Making:   The following items were considered in medical decision making:  Independent review of images  Review / order clinical lab tests  Review / order radiology tests  Decision to obtain old records

## 2023-03-12 NOTE — PROGRESS NOTES
Cardiology Progress Note     Admit Date: 3/10/2023     Reason for follow up: chest pain, syncope    HPI and Interval History:   Vinicius Johnson is a 34 y.o. with no significant past medical history who presented to hospital after a syncopal episode. The duration of the syncopal episode was uncertain but he states that it had to of been brief. 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 He started experiencing chest pain that radiated across his chest to his right arm. States that was pressure-like in nature, that is when he grew lightheaded and passed out. He states that he does not think he had hit his head just slumped down. Hence no headache, diplopia or visual changes. Has been nauseous but no vomiting or diarrhea. No personal significant medical history other than gout and morbid obesity. Significant CAD family history. No history in the family of DVT or PE. Came to the ED with his mother for further evaluation and treatment. Troponin negative x3. EKG shows NSR, with no ischemic changes. Still with right-sided chest pain that is reproducible with palpitation. Has R arm pain and says his fingers on right hand are numb. No SOB or palpitations. Physical Examination:  Vitals:    23 0356   BP: 122/79   Pulse: 86   Resp: 16   Temp: 97.3 °F (36.3 °C)   SpO2: 94%        Intake/Output Summary (Last 24 hours) at 3/12/2023 0750  Last data filed at 3/11/2023 2350  Gross per 24 hour   Intake 360 ml   Output 875 ml   Net -515 ml     In: 360 [P.O.:360]  Out: 354    Wt Readings from Last 3 Encounters:   23 (!) 415 lb 5.6 oz (188.4 kg)   20 (!) 325 lb (147.4 kg)     Temp  Av.8 °F (37.1 °C)  Min: 97.3 °F (36.3 °C)  Max: 102.8 °F (39.3 °C)  Pulse  Av.4  Min: 86  Max: 111  BP  Min: 102/65  Max: 154/94  SpO2  Av.2 %  Min: 92 %  Max: 95 %    Telemetry: Sinus rhythm in the 90s. Constitutional: Alert, in no acute distress. Appears stated age.   Head: Normocephalic and atraumatic.   Eyes: Conjunctivae normal. EOM are normal.   Neck: Neck supple. No lymphadenopathy. No rigidity. No JVD present.   Cardiovascular: Normal rate, regular rhythm. No murmurs, rubs or gallops. No S3 or S4.  Pulmonary/Chest: Clear breath sounds bilaterally. No crackles, wheezes or rhonchi. No respiratory accessory muscle use.    Abdominal: Soft. Normal bowel sounds present. No distension, No tenderness.   Musculoskeletal: No tenderness. No edema    Lymphadenopathy: Has no cervical adenopathy.   Neurological: Alert and oriented. No gross deficits.   Skin: Skin is warm and dry. No rash, lesions, ulcerations noted.  Psychiatric: No anxiety or agitation.      Labs, diagnostic and imaging results reviewed.   Reviewed.   Recent Labs     03/10/23  1402   *   K 3.9      CO2 22   BUN 12   CREATININE 0.8*     Recent Labs     03/10/23  1402 03/11/23  0750   WBC 10.2 8.4   HGB 14.6 13.5   HCT 43.6 41.3   MCV 85.2 86.1    265     Lab Results   Component Value Date/Time    TROPONINI <0.01 03/10/2023 11:19 PM     Estimated Creatinine Clearance: 228 mL/min (A) (based on SCr of 0.8 mg/dL (L)).   No results found for: BNP  No results found for: PROTIME, INR  No results found for: CHOL, HDL, TRIG    Scheduled Meds:   indomethacin  50 mg Oral TID WC    sodium chloride flush  5-40 mL IntraVENous 2 times per day    aspirin  81 mg Oral Daily    atorvastatin  80 mg Oral Nightly    enoxaparin  60 mg SubCUTAneous BID    allopurinol  300 mg Oral Daily    colchicine  0.6 mg Oral BID     Continuous Infusions:   sodium chloride       PRN Meds:sodium chloride flush, sodium chloride, ondansetron **OR** ondansetron, acetaminophen **OR** acetaminophen, polyethylene glycol, tiZANidine     ECG: 3/10/23  SR at 89 BPM.    Echo:3/11/23   Left ventricular cavity size is normal. Left ventricular function is normal,   LV ejection fraction estimated to be 55%   Normal diastolic function   The right ventricle is  normal in size and function. Estimated pulmonary artery systolic pressure is at 27 mmHg assuming a right   atrial pressure of 8 mmHg. No clinically significant valvular heart disease    Assessment and Plan:     Precordial chest pain   - while lifting heavy boxes   - EKG without acute ischemic changes, troponin negative x3   - echo with normal EF, no WMAs   - likely non-cardiac - agree with using NSAIDs as pain is likely musculoskeletal    Syncope   - prodrome of lightheadedness, suspect vasovagal syncope   - nocturnal bradycardia noted with pauses up to 2.8 seconds, likely secondary to untreated BRAIN    - needs outpatient sleep study   - 30 day monitor - to be arranged by office tomorrow, if pt discharged today, will contact him to arrange    Cardiac issues are stable, will sign off and office will arrange follow up and monitor tomorrow but can be discharged today from my standpoint.      YENNI Real  [facility], [address] [place] [address], [address] [place] [address]  Phone: [phone]  Fax: [phone]    Electronically signed by YENNI Fitzpatrick - CNP on 3/12/2023 at 7:50 AM

## 2023-03-12 NOTE — PLAN OF CARE
Problem: Pain  Goal: Verbalizes/displays adequate comfort level or baseline comfort level  3/12/2023 1026 by Eun Cruz RN  Outcome: Progressing     Problem: Safety - Adult  Goal: Free from fall injury  3/12/2023 1026 by Eun Cruz RN  Outcome: Progressing     Problem: ABCDS Injury Assessment  Goal: Absence of physical injury  Outcome: Progressing     Problem: Neurosensory - Adult  Goal: Achieves maximal functionality and self care  3/12/2023 1026 by Eun Cruz RN  Outcome: Progressing     Problem: Respiratory - Adult  Goal: Achieves optimal ventilation and oxygenation  Outcome: Progressing     Problem: Cardiovascular - Adult  Goal: Maintains optimal cardiac output and hemodynamic stability  3/12/2023 1026 by Eun Cruz RN  Outcome: Progressing     Problem: Cardiovascular - Adult  Goal: Absence of cardiac dysrhythmias or at baseline  Outcome: Progressing     Problem: Musculoskeletal - Adult  Goal: Return mobility to safest level of function  3/12/2023 1026 by Eun Cruz RN  Outcome: Progressing     Problem: Genitourinary - Adult  Goal: Absence of urinary retention  Outcome: Progressing     Problem: Infection - Adult  Goal: Absence of infection at discharge  Outcome: Progressing     Problem: Discharge Planning  Goal: Discharge to home or other facility with appropriate resources  3/12/2023 1026 by Eun Cruz RN  Outcome: Progressing

## 2023-03-13 ENCOUNTER — APPOINTMENT (OUTPATIENT)
Dept: MRI IMAGING | Age: 33
DRG: 313 | End: 2023-03-13

## 2023-03-13 DIAGNOSIS — R55 VASOVAGAL SYNCOPE: Primary | ICD-10-CM

## 2023-03-13 PROBLEM — E66.01 MORBID OBESITY WITH BMI OF 50.0-59.9, ADULT (HCC): Status: ACTIVE | Noted: 2023-03-13

## 2023-03-13 PROBLEM — R07.89 ATYPICAL CHEST PAIN: Status: ACTIVE | Noted: 2023-03-10

## 2023-03-13 PROBLEM — R29.818 SUSPECTED SLEEP APNEA: Status: ACTIVE | Noted: 2023-03-13

## 2023-03-13 LAB
CHOLESTEROL, TOTAL: 152 MG/DL (ref 0–199)
HDLC SERPL-MCNC: 34 MG/DL (ref 40–60)
LDL CHOLESTEROL CALCULATED: 89 MG/DL
TRIGL SERPL-MCNC: 144 MG/DL (ref 0–150)
VLDLC SERPL CALC-MCNC: 29 MG/DL

## 2023-03-13 PROCEDURE — 80061 LIPID PANEL: CPT

## 2023-03-13 PROCEDURE — 36415 COLL VENOUS BLD VENIPUNCTURE: CPT

## 2023-03-13 PROCEDURE — 93270 REMOTE 30 DAY ECG REV/REPORT: CPT | Performed by: INTERNAL MEDICINE

## 2023-03-13 PROCEDURE — 99232 SBSQ HOSP IP/OBS MODERATE 35: CPT | Performed by: INTERNAL MEDICINE

## 2023-03-13 PROCEDURE — 1200000000 HC SEMI PRIVATE

## 2023-03-13 PROCEDURE — 2580000003 HC RX 258: Performed by: INTERNAL MEDICINE

## 2023-03-13 PROCEDURE — 6370000000 HC RX 637 (ALT 250 FOR IP): Performed by: INTERNAL MEDICINE

## 2023-03-13 PROCEDURE — 6360000002 HC RX W HCPCS: Performed by: INTERNAL MEDICINE

## 2023-03-13 PROCEDURE — 72141 MRI NECK SPINE W/O DYE: CPT

## 2023-03-13 RX ADMIN — INDOMETHACIN 50 MG: 25 CAPSULE ORAL at 18:42

## 2023-03-13 RX ADMIN — INDOMETHACIN 50 MG: 25 CAPSULE ORAL at 14:52

## 2023-03-13 RX ADMIN — COLCHICINE 0.6 MG: 0.6 TABLET ORAL at 08:13

## 2023-03-13 RX ADMIN — ENOXAPARIN SODIUM 60 MG: 100 INJECTION SUBCUTANEOUS at 20:57

## 2023-03-13 RX ADMIN — DEXAMETHASONE SODIUM PHOSPHATE 4 DROP: 1 SOLUTION/ DROPS OPHTHALMIC at 20:57

## 2023-03-13 RX ADMIN — CIPROFLOXACIN 4 DROP: 3 SOLUTION OPHTHALMIC at 20:57

## 2023-03-13 RX ADMIN — ASPIRIN 81 MG CHEWABLE TABLET 81 MG: 81 TABLET CHEWABLE at 08:13

## 2023-03-13 RX ADMIN — Medication 10 ML: at 08:14

## 2023-03-13 RX ADMIN — ATORVASTATIN CALCIUM 80 MG: 80 TABLET, FILM COATED ORAL at 20:57

## 2023-03-13 RX ADMIN — CIPROFLOXACIN 4 DROP: 3 SOLUTION OPHTHALMIC at 08:16

## 2023-03-13 RX ADMIN — DEXAMETHASONE SODIUM PHOSPHATE 4 DROP: 1 SOLUTION/ DROPS OPHTHALMIC at 08:16

## 2023-03-13 RX ADMIN — COLCHICINE 0.6 MG: 0.6 TABLET ORAL at 20:57

## 2023-03-13 RX ADMIN — INDOMETHACIN 50 MG: 25 CAPSULE ORAL at 08:13

## 2023-03-13 RX ADMIN — Medication 10 ML: at 20:57

## 2023-03-13 RX ADMIN — ENOXAPARIN SODIUM 60 MG: 100 INJECTION SUBCUTANEOUS at 08:13

## 2023-03-13 RX ADMIN — ALLOPURINOL 300 MG: 300 TABLET ORAL at 08:13

## 2023-03-13 ASSESSMENT — PAIN DESCRIPTION - ORIENTATION
ORIENTATION: RIGHT
ORIENTATION: RIGHT

## 2023-03-13 ASSESSMENT — PAIN DESCRIPTION - DESCRIPTORS
DESCRIPTORS: ACHING
DESCRIPTORS: ACHING;DISCOMFORT

## 2023-03-13 ASSESSMENT — PAIN SCALES - GENERAL
PAINLEVEL_OUTOF10: 4
PAINLEVEL_OUTOF10: 7

## 2023-03-13 ASSESSMENT — PAIN - FUNCTIONAL ASSESSMENT: PAIN_FUNCTIONAL_ASSESSMENT: ACTIVITIES ARE NOT PREVENTED

## 2023-03-13 ASSESSMENT — PAIN DESCRIPTION - LOCATION
LOCATION: ELBOW
LOCATION: ARM;CHEST

## 2023-03-13 NOTE — CASE COMMUNICATION
Case Management Assessment  Initial Evaluation    Date/Time of Evaluation: 3/13/2023 1:01 PM  Assessment Completed by: Reid Lehman RN    If patient is discharged prior to next notation, then this note serves as note for discharge by case management. Plan: Discharge plan is to return to home alone. Provided with a 30 day event monitor. Patient does not have health insurance and may require sunitha meds depending on the cost. Mother will provide transportation at discharge. No other needs identified. Patient Name: Nuris Hdz                   YOB: 1990  Diagnosis: Syncope and collapse [R55]  Chest pain [R07.9]  Right arm pain [M79.601]  Chest pain, unspecified type [R07.9]                   Date / Time: 3/10/2023  1:41 PM    Patient Admission Status: Inpatient   Readmission Risk (Low < 19, Mod (19-27), High > 27): Readmission Risk Score: 7    Current PCP: Nima Montiel  PCP verified by CM? Yes    Chart Reviewed: Yes      History Provided by: Patient  Patient Orientation: Alert and Oriented, Person, Place, Situation, Self    Patient Cognition: Alert    Hospitalization in the last 30 days (Readmission):  No    If yes, Readmission Assessment in CM Navigator will be completed. Advance Directives:      Code Status: Full Code   Patient's Primary Decision Maker is: Legal Next of Kin    Primary Decision MakerNilson Caldwell  Jani - [phone]    Discharge Planning:    Patient lives with: Alone Type of Home: Apartment  Primary Care Giver: Self  Patient Support Systems include: Parent, Family Members   Current Financial resources: None  Current community resources: Lodging  Current services prior to admission: None            Current DME:              Type of Home Care services:  None    ADLS  Prior functional level: Independent in ADLs/IADLs  Current functional level: Independent in ADLs/IADLs    PT AM-PAC:   /24  OT AM-PAC:   /24    Family can provide assistance at DC:  Yes  Would you like Case Management to discuss the discharge plan with any other family members/significant others, and if so, who? No  Plans to Return to Present Housing: Yes  Other Identified Issues/Barriers to RETURNING to current housing: None  Potential Assistance needed at discharge: N/A            Potential DME:    Patient expects to discharge to: [address] [place] [address] for transportation at discharge: Family (Private Car)    Financial    Payor: /     Does insurance require precert for SNF: No    Potential assistance Purchasing Medications: Yes  Meds-to-Beds request: Yes      Allie Priest [id number] - [address] [facility],[address], [place] [address] [address]  Phone: [phone] Fax: [phone] [address] [facility] [address], [address] [facility] [phone] Pamla Erps [phone]  [address] [address]  Phone: [phone] Fax: [phone]      Notes:    Factors facilitating achievement of predicted outcomes: Family support and Motivated    Barriers to discharge: None    Additional Case Management Notes: Discharge plan is to return to home alone. Provided with a 30 day event monitor. Patient does not have health insurance and may require sunitha meds depending on the cost. Mother will provide transportation at discharge. No other needs identified. The Plan for Transition of Care is related to the following treatment goals of Syncope and collapse [R55]  Chest pain [R07.9]  Right arm pain [M79.601]  Chest pain, unspecified type [R07.9]      The Patient and/or Patient Representative Agree with the Discharge Plan?  Yes    Genie Gutierrez RN  Case Management Department  Ph: [phone] Fax: [phone]

## 2023-03-13 NOTE — PROGRESS NOTES
Hospitalist Progress Note      PCP: Hao Medley    Chief Complaint. Patient is a 79-year-old male, morbidly obese was admitted for chest pain . The chest pain was associated with lightheadedness/ syncope /collapse and loss of consciousness . He has a strong family history of CAD. No shortness of breath nausea vomiting diarrhea constipation dysuria fevers chills. He had associated nausea. He also reported right arm pain    Date of Admission: 3/10/2023      Subjective:     Still has chest pain-sternal  No nausea, vomiting, shortness of breath, fever or chills. Medications:  Reviewed    Infusion Medications    sodium chloride       Scheduled Medications    ciprofloxacin  4 drop Right Ear BID    And    dexamethasone  4 drop Right Ear BID    indomethacin  50 mg Oral TID WC    sodium chloride flush  5-40 mL IntraVENous 2 times per day    aspirin  81 mg Oral Daily    atorvastatin  80 mg Oral Nightly    enoxaparin  60 mg SubCUTAneous BID    allopurinol  300 mg Oral Daily    colchicine  0.6 mg Oral BID     PRN Meds: sodium chloride flush, sodium chloride, ondansetron **OR** ondansetron, acetaminophen **OR** acetaminophen, polyethylene glycol, tiZANidine      Intake/Output Summary (Last 24 hours) at 3/13/2023 1427  Last data filed at 3/13/2023 0456  Gross per 24 hour   Intake 640 ml   Output 1250 ml   Net -610 ml         Physical Exam Performed:    /83   Pulse 87   Temp 98 °F (36.7 °C) (Oral)   Resp 18   Ht 5' 11\" (1.803 m)   Wt (!) 415 lb 12.6 oz (188.6 kg)   SpO2 98%   BMI 57.99 kg/m²     General appearance: No apparent distress,   HEENT:  Conjunctivae/corneas clear. Neck: Supple, with full range of motion. Respiratory:  Normal respiratory effort. Clear to auscultation, reproducible chest pain on sternal pressure. Cardiovascular: Regular rate and rhythm with normal S1/S2 without murmurs or rubs  Abdomen: Soft, non-tender, non-distended, normal bowel sounds.   Musculoskeletal: R elbow tender to touch, No cyanosis or edema bilaterally  Neurologic:  without any focal sensory/motor deficits. grossly non-focal.  Psychiatric: Alert and oriented, Normal mood  Peripheral Pulses: +2 palpable, equal bilaterally       Labs:   Recent Labs     03/10/23  1402 03/11/23  0750   WBC 10.2 8.4   HGB 14.6 13.5   HCT 43.6 41.3    265       Recent Labs     03/10/23  1402   *   K 3.9      CO2 22   BUN 12   CREATININE 0.8*   CALCIUM 9.5       Recent Labs     03/10/23  1402   AST 16   ALT 28   BILITOT 0.8   ALKPHOS 81       No results for input(s): INR in the last 72 hours. Recent Labs     03/10/23  1807 03/10/23  2030 03/10/23  2319   TROPONINI <0.01 <0.01 <0.01         Urinalysis:    No results found for: Teola Mcneal, BACTERIA, RBCUA, BLOODU, SPECGRAV, GLUCOSEU    Radiology:  XR ELBOW RIGHT (MIN 3 VIEWS)   Final Result   Posterior soft tissue swelling, probably olecranon bursitis, with additional   edema extending both proximally and distally. Focal lucencies within the posterior soft tissues at the level of the mid   distal humerus, medial unclear. These could potentially be superficial the   patient, but soft tissue gas must be considered as well. If there is any   evidence of necrotizing infection, CT would be recommended. CT CHEST PULMONARY EMBOLISM W CONTRAST   Final Result   No evidence of pulmonary embolism or acute pulmonary abnormality. XR CHEST (2 VW)   Final Result   Haziness in the lungs could be partly from patient's body habitus versus   minimal congestion. No focal consolidation. No pneumothorax. Assessment/Plan:    Active Hospital Problems    Diagnosis     Vasovagal syncope [R55]      Priority: Medium    Chest pain [R07.9]      Priority: Medium     Patient is a 72-year-old male who presented to hospital due to chest pain.   According to patient he had chest pain today followed by loss of consciousness, he also mentions he had associated lightheadedness episode. He has a strong family history of CAD. Patient otherwise denied shortness of breath nausea vomiting diarrhea constipation dysuria fevers chills. He had associated nausea. He also reported right arm pain     Assessment  Chest pain rule out ACS. No PE on CTPA chest. Reproducible. Syncope-likely vasovagal.  R elbow pain - due to bursitis, Gout  RUE sensory loss on hands/forearm  R ear pain due to right acute otitis media. (Has left tympanic membrane perforation). Right arm pain  Morbid obesity  Lactic acidosis  Hyponatremia  Elevated D-dimer  Right acute otitis media, left tympanic membrane perforation with purulent drainage     Plan  Uric acid high, ordered Indomethacin, 1 x solumedrol, continue Home colchicine  Ciprodex otic right ear 4 drops BID x 7 days  Check orthostatic vitals  Monitor on cardiac telemetry  Check echocardiogram - unremarkable for RWMA, EF 55%  Monitor and replace electrolytes  Aspirin, statin  Resume home medications  Needs outpatient sleep study  30 day monitor in situ. Follow up with [facility] to establish  MRI cervical spine  Neurology consult    DVT prophylaxis-Lovenox  Diet: ADULT DIET; Regular; No Caffeine  Code Status: Full Code    PT/OT Eval Status: ordered    Dispo/Plan of care -  DC if Cardiology ok.     ENT consulted for R ear pain    Nikko Brown MD

## 2023-03-13 NOTE — PROGRESS NOTES
[facility] 83   Daily Progress Note      Admit Date:  3/10/2023    Reason for initial consultation: chest pain and syncope    CC: \"Still having chest pain\"    Interval History:  Pt with no acute overnight events. Cardiology apparently signed off but the patient's mother is requesting reconsultation for the continued chest pain. The patient himself says the pain is reproducible with pressing on the right side of his chest or with specific movements. The mother is concerned because her  had an MI at a young age resulting in his death. Past surgical history/social history/family history:   has a past surgical history that includes Eye surgery (2000). reports that he has been smoking. He has never used smokeless tobacco. He reports current alcohol use. He reports current drug use. Drug: Marijuana Garrel Calender). family history is not on file. Review of Systems:   Constitutional: No unanticipated weight loss. There's been no change in energy level, sleep pattern, or activity level. No fevers, chills. Eyes: No visual changes or diplopia. No scleral icterus. ENT: No Headaches, hearing loss or vertigo. No mouth sores or sore throat. Cardiovascular: as reviewed in HPI  Respiratory: No cough or wheezing, no sputum production. No hemoptysis. Gastrointestinal: No abdominal pain, appetite loss, blood in stools. No change in bowel or bladder habits. Genitourinary: No dysuria, trouble voiding, or hematuria. Musculoskeletal:  No gait disturbance, no joint complaints. Integumentary: No rash or pruritis. Neurological: No headache, diplopia, change in muscle strength, numbness or tingling. Psychiatric: No anxiety or depression. Endocrine: No temperature intolerance. No excessive thirst, fluid intake, or urination. No tremor. Hematologic/Lymphatic: No abnormal bruising or bleeding, blood clots or swollen lymph nodes. Allergic/Immunologic: No nasal congestion or hives.     Objective:   BP (!) 141/86   Pulse (!) 105   Temp 97.7 °F (36.5 °C) (Oral)   Resp 18   Ht 5' 11\" (1.803 m)   Wt (!) 415 lb 12.6 oz (188.6 kg)   SpO2 96%   BMI 57.99 kg/m²     Intake/Output Summary (Last 24 hours) at 3/13/2023 1534  Last data filed at 3/13/2023 0456  Gross per 24 hour   Intake 640 ml   Output 1250 ml   Net -610 ml     Wt Readings from Last 3 Encounters:   03/13/23 (!) 415 lb 12.6 oz (188.6 kg)   04/05/20 (!) 325 lb (147.4 kg)       Physical Exam:  General:  NAD. Morbidly obese. Eyes: Pupils equal and round. EOMI. No icterus. Skin:  Warm and dry. No new appearing rashes or lesions. Neck:  Supple. No JVP or bruit appreciated. Respiratory:  No respiratory distress. Clear to auscultation. No wheezes/rhonchi/rales  Cardiovascular: + Right-sided chest wall pain. Regular rate. S1S2. No M/R/G. Abdomen:  Soft, nontender, +bowel sounds. MSK:  No LE edema. No clubbing or cyanosis. Moves all extremities. Psych: Normal insight and judgement. Awake, alert, and oriented X4. Medications:    ciprofloxacin  4 drop Right Ear BID    And    dexamethasone  4 drop Right Ear BID    indomethacin  50 mg Oral TID WC    sodium chloride flush  5-40 mL IntraVENous 2 times per day    aspirin  81 mg Oral Daily    atorvastatin  80 mg Oral Nightly    enoxaparin  60 mg SubCUTAneous BID    allopurinol  300 mg Oral Daily    colchicine  0.6 mg Oral BID      sodium chloride       sodium chloride flush, sodium chloride, ondansetron **OR** ondansetron, acetaminophen **OR** acetaminophen, polyethylene glycol, tiZANidine    Lab Data:  CBC:   Recent Labs     03/11/23  0750   WBC 8.4   HGB 13.5        BMP:  No results for input(s): NA, K, CO2, BUN, CREATININE, CA in the last 72 hours. LIVR: No results for input(s): AST, ALT in the last 72 hours. INR:  No results for input(s): INR in the last 72 hours. APTT: No results for input(s): APTT in the last 72 hours. BNP:  No results for input(s): BNP in the last 72 hours.     Telemetry: Personally interpreted. Sinus. ECG: Personally interpreted. 3/12/2023. Normal sinus rhythm. Normal ECG. Echo: 3/11/2023. Left ventricular cavity size is normal. Left ventricular function is normal, LV ejection fraction estimated to be 55%. Normal diastolic function  The right ventricle is normal in size and function. Estimated pulmonary artery systolic pressure is at 27 mmHg assuming a right  atrial pressure of 8 mmHg. No clinically significant valvular heart disease    Assessment/Plan:    1) Noncardiac chest pain. Patient ruled out for acute coronary syndrome with normal troponins and a normal EKG. Patient has reproducible chest wall pain. No plans for inpatient stress testing. Discussed the importance of risk factor modification including weight loss. Will further risk stratify with a lipid panel. Discussed that stress testing would not elucidate the cause of his current musculoskeletal pain but if the patient has new/different chest pains or worsening dyspnea on exertion, an outpatient exercise treadmill stress test could be performed. Also discussed that a normal stress test does not mean that the patient could not have an MI or sudden cardiac death as it occurred in his father. 2) Syncope and collapse. Management per EP but description sounds vasovagal.  Patient being set up with a 30-day event monitor. 3) Morbid obesity/suspected sleep apnea. BMI 58. Encouraged weight loss. Will sign off. Call with questions.      Electronically signed by Jameson Benton MD on 3/13/2023 at 3:34 PM

## 2023-03-13 NOTE — PROGRESS NOTES
Discussed with Marce Rahman RN that 30 day Event will have to be removed for MRI and contact cardiology prior discharge to re-attach 30 day Event monitor. BARB Corcoran aware. Dr. Tolliver Gains ischemic workup consult.

## 2023-03-13 NOTE — PLAN OF CARE
Pt in bed A&O x4. Pt c/o pain to R elbow and radiating down arm. Slight redness noted to R elbow. Pt denies any other needs. Call light in reach and fall precautions in place. Electronically signed by Davon Navarrete RN on 3/13/2023 at 9:24 AM    Problem: Pain  Goal: Verbalizes/displays adequate comfort level or baseline comfort level  3/13/2023 0921 by Davon Navarrete RN  Outcome: Progressing  Note: Pt educated on using pain scale. Pt given PRN pain medication per Dr orders see Bill Willingham. Pt agreeable to pain management. Problem: Safety - Adult  Goal: Free from fall injury  3/13/2023 0921 by Davon Navarrete RN  Outcome: Progressing  Note: Fall precautions in place. Bed in lowest position, wheels locked, call light in reach, non slip socks on. Pt educated on using call light for assistance. Pt agreeable. Problem: Skin/Tissue Integrity - Adult  Goal: Skin integrity remains intact  Outcome: Progressing  Note: Skin assessment per shift. Pt educated on turning and repositioning. Pt agreeable. Pillow support offered.

## 2023-03-13 NOTE — PROGRESS NOTES
Pt mom in room with pt. States she feels like she is not being listened too by cardiologist. Mom states she would like to see another cardiologist. Dr Alexei Grajeda made aware.  Electronically signed by Tomas Fatima RN on 3/13/2023 at 5:00 PM

## 2023-03-13 NOTE — PROGRESS NOTES
Hospitalist Progress Note      PCP: No primary care provider on file. Chief Complaint. Patient is a 21-year-old male, morbidly obese was admitted for chest pain . The chest pain was associated with lightheadedness/ syncope /collapse and loss of consciousness . He has a strong family history of CAD. No shortness of breath nausea vomiting diarrhea constipation dysuria fevers chills. He had associated nausea. He also reported right arm pain    Date of Admission: 3/10/2023      Subjective:     No chest pain, nausea, vomiting, shortness of breath, fever or chills. mention feels overall better    Medications:  Reviewed    Infusion Medications    sodium chloride       Scheduled Medications    ciprofloxacin  4 drop Right Ear BID    And    dexamethasone  4 drop Right Ear BID    indomethacin  50 mg Oral TID WC    sodium chloride flush  5-40 mL IntraVENous 2 times per day    aspirin  81 mg Oral Daily    atorvastatin  80 mg Oral Nightly    enoxaparin  60 mg SubCUTAneous BID    allopurinol  300 mg Oral Daily    colchicine  0.6 mg Oral BID     PRN Meds: sodium chloride flush, sodium chloride, ondansetron **OR** ondansetron, acetaminophen **OR** acetaminophen, polyethylene glycol, tiZANidine      Intake/Output Summary (Last 24 hours) at 3/12/2023 1244  Last data filed at 3/12/2023 1211  Gross per 24 hour   Intake 360 ml   Output 2275 ml   Net -1915 ml         Physical Exam Performed:    BP (!) 168/96   Pulse 88   Temp 97.9 °F (36.6 °C) (Oral)   Resp 16   Ht 5' 11\" (1.803 m)   Wt (!) 415 lb 5.6 oz (188.4 kg)   SpO2 98%   BMI 57.93 kg/m²     General appearance: No apparent distress,   HEENT:  Conjunctivae/corneas clear. Neck: Supple, with full range of motion. Respiratory:  Normal respiratory effort. Clear to auscultation, bilaterally without Rales/Wheezes/Rhonchi.   Cardiovascular: Regular rate and rhythm with normal S1/S2 without murmurs or rubs  Abdomen: Soft, non-tender, non-distended, normal bowel Outreach attempt made and LVM for patient to schedule an appt.    sounds. Musculoskeletal: R elbow tender to touch, No cyanosis or edema bilaterally  Neurologic:  without any focal sensory/motor deficits. grossly non-focal.  Psychiatric: Alert and oriented, Normal mood  Peripheral Pulses: +2 palpable, equal bilaterally       Labs:   Recent Labs     03/10/23  1402 03/11/23  0750   WBC 10.2 8.4   HGB 14.6 13.5   HCT 43.6 41.3    265       Recent Labs     03/10/23  1402   *   K 3.9      CO2 22   BUN 12   CREATININE 0.8*   CALCIUM 9.5       Recent Labs     03/10/23  1402   AST 16   ALT 28   BILITOT 0.8   ALKPHOS 81       No results for input(s): INR in the last 72 hours. Recent Labs     03/10/23  1807 03/10/23  2030 03/10/23  2319   TROPONINI <0.01 <0.01 <0.01         Urinalysis:    No results found for: [place], BACTERIA, RBCUA, BLOODU, SPECGRAV, GLUCOSEU    Radiology:  XR ELBOW RIGHT (MIN 3 VIEWS)   Final Result   Posterior soft tissue swelling, probably olecranon bursitis, with additional   edema extending both proximally and distally. Focal lucencies within the posterior soft tissues at the level of the mid   distal humerus, medial unclear. These could potentially be superficial the   patient, but soft tissue gas must be considered as well. If there is any   evidence of necrotizing infection, CT would be recommended. CT CHEST PULMONARY EMBOLISM W CONTRAST   Final Result   No evidence of pulmonary embolism or acute pulmonary abnormality. XR CHEST (2 VW)   Final Result   Haziness in the lungs could be partly from patient's body habitus versus   minimal congestion. No focal consolidation. No pneumothorax. Assessment/Plan:    Active Hospital Problems    Diagnosis     Vasovagal syncope [R55]      Priority: Medium    Chest pain [R07.9]      Priority: Medium     Patient is a 24-year-old male who presented to hospital due to chest pain.   According to patient he had chest pain today followed by loss of consciousness, he also mentions he had associated lightheadedness episode. He has a strong family history of CAD. Patient otherwise denied shortness of breath nausea vomiting diarrhea constipation dysuria fevers chills. He had associated nausea. He also reported right arm pain     Assessment  Chest pain rule out ACS. No PE on CTPA chest.  Syncope-likely vasovagal.  R elbow pain - due to bursitis, Gout  R ear pain  Right arm pain  Morbid obesity  Lactic acidosis  Hyponatremia  Elevated D-dimer  Right acute otitis media, left tympanic membrane perforation with purulent drainage     Plan  Uric acid high, ordered Indomethacin, 1 x solumedrol, continue Home colchicine  Ciprodex otic right ear 4 drops BID x 7 days  Check orthostatic vitals  Monitor on cardiac telemetry  Check echocardiogram - unremarkable for RWMA, EF 55%  Monitor and replace electrolytes  Aspirin, statin  Resume home medications        DVT prophylaxis-Lovenox  Diet: ADULT DIET; Regular;  No Caffeine  Code Status: Full Code    PT/OT Eval Status: ordered    Dispo/Plan of care -  Cardiology do not think needs stress test, patient and mother adamant about getting test, explained to mother, but Mother would like to talk to Cardiology  ENT consulted for R ear pain    Radha Larson MD

## 2023-03-13 NOTE — PROGRESS NOTES
Per EP team-Dr. Ayde Valenzuela, NP 30 day Event monitor and 6-8 week f/u in the office. Will send available MA to floor.

## 2023-03-13 NOTE — PROGRESS NOTES
Pt mom in room requesting stress test and sleep study. Dr Sherice Sykes notified, requested we reach out to cardio. [facility] called and left and notified.  Electronically signed by Bridgette Schultz RN on 3/13/2023 at 2:07 PM

## 2023-03-14 VITALS
RESPIRATION RATE: 18 BRPM | WEIGHT: 315 LBS | BODY MASS INDEX: 44.1 KG/M2 | HEIGHT: 71 IN | OXYGEN SATURATION: 97 % | TEMPERATURE: 97.7 F | HEART RATE: 94 BPM | SYSTOLIC BLOOD PRESSURE: 120 MMHG | DIASTOLIC BLOOD PRESSURE: 84 MMHG

## 2023-03-14 PROCEDURE — 6370000000 HC RX 637 (ALT 250 FOR IP): Performed by: INTERNAL MEDICINE

## 2023-03-14 PROCEDURE — 94760 N-INVAS EAR/PLS OXIMETRY 1: CPT

## 2023-03-14 PROCEDURE — 6360000002 HC RX W HCPCS: Performed by: NURSE PRACTITIONER

## 2023-03-14 PROCEDURE — 6360000002 HC RX W HCPCS: Performed by: INTERNAL MEDICINE

## 2023-03-14 PROCEDURE — 2580000003 HC RX 258: Performed by: INTERNAL MEDICINE

## 2023-03-14 PROCEDURE — 93017 CV STRESS TEST TRACING ONLY: CPT

## 2023-03-14 RX ORDER — HYDRALAZINE HYDROCHLORIDE 20 MG/ML
10 INJECTION INTRAMUSCULAR; INTRAVENOUS EVERY 6 HOURS PRN
Status: DISCONTINUED | OUTPATIENT
Start: 2023-03-14 | End: 2023-03-14 | Stop reason: HOSPADM

## 2023-03-14 RX ORDER — ASPIRIN 81 MG/1
81 TABLET, CHEWABLE ORAL DAILY
Qty: 30 TABLET | Refills: 3 | Status: SHIPPED | OUTPATIENT
Start: 2023-03-15

## 2023-03-14 RX ORDER — ATORVASTATIN CALCIUM 80 MG/1
80 TABLET, FILM COATED ORAL NIGHTLY
Qty: 30 TABLET | Refills: 3 | Status: SHIPPED | OUTPATIENT
Start: 2023-03-14

## 2023-03-14 RX ADMIN — ASPIRIN 81 MG CHEWABLE TABLET 81 MG: 81 TABLET CHEWABLE at 08:16

## 2023-03-14 RX ADMIN — COLCHICINE 0.6 MG: 0.6 TABLET ORAL at 08:16

## 2023-03-14 RX ADMIN — ALLOPURINOL 300 MG: 300 TABLET ORAL at 08:16

## 2023-03-14 RX ADMIN — CIPROFLOXACIN 4 DROP: 3 SOLUTION OPHTHALMIC at 08:16

## 2023-03-14 RX ADMIN — Medication 10 ML: at 08:15

## 2023-03-14 RX ADMIN — INDOMETHACIN 50 MG: 25 CAPSULE ORAL at 08:15

## 2023-03-14 RX ADMIN — ENOXAPARIN SODIUM 60 MG: 100 INJECTION SUBCUTANEOUS at 08:15

## 2023-03-14 RX ADMIN — INDOMETHACIN 50 MG: 25 CAPSULE ORAL at 12:37

## 2023-03-14 RX ADMIN — HYDRALAZINE HYDROCHLORIDE 10 MG: 20 INJECTION INTRAMUSCULAR; INTRAVENOUS at 03:12

## 2023-03-14 RX ADMIN — DEXAMETHASONE SODIUM PHOSPHATE 4 DROP: 1 SOLUTION/ DROPS OPHTHALMIC at 08:16

## 2023-03-14 ASSESSMENT — PAIN SCALES - GENERAL: PAINLEVEL_OUTOF10: 0

## 2023-03-14 NOTE — PLAN OF CARE
Problem: Discharge Planning  Goal: Discharge to home or other facility with appropriate resources  3/14/2023 0909 by Melissa Rincon RN  Outcome: Progressing  3/14/2023 0335 by Colleen Martinez RN  Outcome: Progressing  Flowsheets (Taken 3/13/2023 2015)  Discharge to home or other facility with appropriate resources: Identify barriers to discharge with patient and caregiver     Problem: Pain  Goal: Verbalizes/displays adequate comfort level or baseline comfort level  3/14/2023 0909 by Melissa Rincon RN  Outcome: Progressing  3/14/2023 0335 by Colleen Martinez RN  Outcome: Progressing     Problem: Safety - Adult  Goal: Free from fall injury  3/14/2023 0909 by Melissa Rincon RN  Outcome: Progressing  3/14/2023 0335 by Colleen Martinez RN  Outcome: Progressing  Flowsheets (Taken 3/14/2023 0335)  Free From Fall Injury: Instruct family/caregiver on patient safety     Problem: ABCDS Injury Assessment  Goal: Absence of physical injury  3/14/2023 0909 by Melissa Rincon RN  Outcome: Progressing  3/14/2023 0335 by Colleen Martinez RN  Outcome: Progressing  Flowsheets (Taken 3/14/2023 0335)  Absence of Physical Injury: Implement safety measures based on patient assessment     Problem: Neurosensory - Adult  Goal: Achieves maximal functionality and self care  3/14/2023 0909 by Melissa Rincon RN  Outcome: Progressing  3/14/2023 0335 by Colleen Martinez RN  Outcome: Progressing  Flowsheets (Taken 3/13/2023 2015)  Achieves maximal functionality and self care: Encourage and assist patient to increase activity and self care with guidance from physical therapy/occupational therapy     Problem: Respiratory - Adult  Goal: Achieves optimal ventilation and oxygenation  3/14/2023 0909 by Melissa Rincon RN  Outcome: Progressing  3/14/2023 0335 by Colleen Martinez RN  Outcome: Progressing  Flowsheets (Taken 3/13/2023 2015)  Achieves optimal ventilation and oxygenation: Assess for changes in respiratory status     Problem: Cardiovascular - Adult  Goal: Maintains optimal cardiac output and hemodynamic stability  3/14/2023 0909 by Wilber Hernandez RN  Outcome: Progressing  3/14/2023 0335 by Onofre Mace RN  Outcome: Progressing  Flowsheets (Taken 3/13/2023 2015)  Maintains optimal cardiac output and hemodynamic stability: Monitor blood pressure and heart rate  Goal: Absence of cardiac dysrhythmias or at baseline  3/14/2023 0909 by Wilber Hernandez RN  Outcome: Progressing  3/14/2023 0335 by Onofre Mace RN  Outcome: Progressing  Flowsheets (Taken 3/13/2023 2015)  Absence of cardiac dysrhythmias or at baseline: Monitor cardiac rate and rhythm     Problem: Skin/Tissue Integrity - Adult  Goal: Skin integrity remains intact  3/14/2023 0909 by Wilber Hernandez RN  Outcome: Progressing  3/14/2023 0335 by Onofre Mace RN  Outcome: Progressing  Flowsheets (Taken 3/13/2023 2015)  Skin Integrity Remains Intact: Monitor for areas of redness and/or skin breakdown     Problem: Musculoskeletal - Adult  Goal: Return mobility to safest level of function  3/14/2023 0909 by Wilber Hernandez RN  Outcome: Progressing  3/14/2023 0335 by Onofre Mace RN  Outcome: Progressing  Flowsheets (Taken 3/13/2023 2015)  Return Mobility to Safest Level of Function: Assess patient stability and activity tolerance for standing, transferring and ambulating with or without assistive devices     Problem: Genitourinary - Adult  Goal: Absence of urinary retention  3/14/2023 0909 by Wilber Hernandez RN  Outcome: Progressing  3/14/2023 0335 by Onofre Mace RN  Outcome: Progressing  Flowsheets (Taken 3/13/2023 2015)  Absence of urinary retention: Assess patients ability to void and empty bladder     Problem: Infection - Adult  Goal: Absence of infection at discharge  3/14/2023 0909 by Wilber Hernandez RN  Outcome: Progressing  3/14/2023 0335 by Onofre Mace RN  Outcome: Progressing  Flowsheets (Taken 3/13/2023 2015)  Absence of infection at discharge: Assess and monitor for signs and symptoms of infection Problem: Skin/Tissue Integrity  Goal: Absence of new skin breakdown  Description: 1. Monitor for areas of redness and/or skin breakdown  2. Assess vascular access sites hourly  3. Every 4-6 hours minimum:  Change oxygen saturation probe site  4. Every 4-6 hours:  If on nasal continuous positive airway pressure, respiratory therapy assess nares and determine need for appliance change or resting period.   3/14/2023 0909 by Vanda Hernandez RN  Outcome: Progressing  3/14/2023 0335 by Tara Talbot RN  Outcome: Progressing

## 2023-03-14 NOTE — PLAN OF CARE
Problem: Discharge Planning  Goal: Discharge to home or other facility with appropriate resources  Outcome: Progressing  Flowsheets (Taken 3/13/2023 2015)  Discharge to home or other facility with appropriate resources: Identify barriers to discharge with patient and caregiver     Problem: Pain  Goal: Verbalizes/displays adequate comfort level or baseline comfort level  Outcome: Progressing     Problem: Safety - Adult  Goal: Free from fall injury  Outcome: Progressing  Flowsheets (Taken 3/14/2023 0335)  Free From Fall Injury: Instruct family/caregiver on patient safety     Problem: ABCDS Injury Assessment  Goal: Absence of physical injury  Outcome: Progressing  Flowsheets (Taken 3/14/2023 0335)  Absence of Physical Injury: Implement safety measures based on patient assessment     Problem: Neurosensory - Adult  Goal: Achieves maximal functionality and self care  Outcome: Progressing  Flowsheets (Taken 3/13/2023 2015)  Achieves maximal functionality and self care: Encourage and assist patient to increase activity and self care with guidance from physical therapy/occupational therapy     Problem: Respiratory - Adult  Goal: Achieves optimal ventilation and oxygenation  Outcome: Progressing  Flowsheets (Taken 3/13/2023 2015)  Achieves optimal ventilation and oxygenation: Assess for changes in respiratory status     Problem: Cardiovascular - Adult  Goal: Maintains optimal cardiac output and hemodynamic stability  Outcome: Progressing  Flowsheets (Taken 3/13/2023 2015)  Maintains optimal cardiac output and hemodynamic stability: Monitor blood pressure and heart rate  Goal: Absence of cardiac dysrhythmias or at baseline  Outcome: Progressing  Flowsheets (Taken 3/13/2023 2015)  Absence of cardiac dysrhythmias or at baseline: Monitor cardiac rate and rhythm     Problem: Skin/Tissue Integrity - Adult  Goal: Skin integrity remains intact  Outcome: Progressing  Flowsheets (Taken 3/13/2023 2015)  Skin Integrity Remains Intact: Monitor for areas of redness and/or skin breakdown     Problem: Musculoskeletal - Adult  Goal: Return mobility to safest level of function  Outcome: Progressing  Flowsheets (Taken 3/13/2023 2015)  Return Mobility to Safest Level of Function: Assess patient stability and activity tolerance for standing, transferring and ambulating with or without assistive devices     Problem: Genitourinary - Adult  Goal: Absence of urinary retention  Outcome: Progressing  Flowsheets (Taken 3/13/2023 2015)  Absence of urinary retention: Assess patients ability to void and empty bladder     Problem: Infection - Adult  Goal: Absence of infection at discharge  Outcome: Progressing  Flowsheets (Taken 3/13/2023 2015)  Absence of infection at discharge: Assess and monitor for signs and symptoms of infection     Problem: Skin/Tissue Integrity  Goal: Absence of new skin breakdown  Description: 1. Monitor for areas of redness and/or skin breakdown  2. Assess vascular access sites hourly  3. Every 4-6 hours minimum:  Change oxygen saturation probe site  4. Every 4-6 hours:  If on nasal continuous positive airway pressure, respiratory therapy assess nares and determine need for appliance change or resting period.   Outcome: Progressing

## 2023-03-14 NOTE — CARE COORDINATION
CASE MANAGEMENT DISCHARGE SUMMARY: Discharge order noted. Patient returning to home alone with a 30 day event monitor.      DISCHARGE DATE: 3/14/23    DISCHARGED TO HOME     TRANSPORTATION: Family             TIME: Afternoon     PREFERRED PHARMACY: [address] [place] [address]       Jose CEBALLOS, RN  Case Management  [phone]             Electronically signed by Jose Chauhan RN on 3/14/2023 at 4:21 PM

## 2023-03-14 NOTE — PROGRESS NOTES
Blood pressure now 110/72. Patient resting in bed. All needed items within reach. Electronically signed by Alison De Luna RN on 3/14/2023 at 6:35 AM

## 2023-03-14 NOTE — CONSULTS
Neurology Consult Note  Reason for Consult: RUE/fingers/forearm sensory loss, morbidly obese    Chief complaint: chest pain    Dr Naya Ventura MD asked me to see Jasmyn Underwood in consultation for evaluation of RUE/fingers/forearm sensory loss, morbidly obese    History of Present Illness:  Jasmyn Underwood is a 34 y.o. male who presents with chest pain. I obtained my information via interview w/ the patient, supplemented by chart review. On 3/10 the patient was at work when abruptly began having chest pain. He feels like he may have lost consciousness for a couple of seconds because he fell to his knees. He felt really dizzy, chest pain persisted, and was having really bad shortness of breath. Around the same time as this incident, he started to notice that from his R elbow around to the top of his forearm down to digits 1-3 he was having an strange sensation. He describes it like an achy feeling, then more numbness in his hand and involved fingers. Perhaps a bit of weakness. Sometimes if he lifts his arm up he may feel the achy type sensation a bit above his elbow area. This feeling in his RUE has gotten better though not quite back to normal.      He does follow w/ Rheumatology for gout and inflammatory arthritis.       Medical History:  Past Medical History:   Diagnosis Date    Kidney disorder     patient has a horseshoe shaped kidney     Past Surgical History:   Procedure Laterality Date    EYE SURGERY  2000     Scheduled Meds:   ciprofloxacin  4 drop Right Ear BID    And    dexamethasone  4 drop Right Ear BID    indomethacin  50 mg Oral TID WC    sodium chloride flush  5-40 mL IntraVENous 2 times per day    aspirin  81 mg Oral Daily    atorvastatin  80 mg Oral Nightly    enoxaparin  60 mg SubCUTAneous BID    allopurinol  300 mg Oral Daily    colchicine  0.6 mg Oral BID     Medications Prior to Admission:   allopurinol (ZYLOPRIM) 300 MG tablet, Take 300 mg by mouth daily  colchicine (COLCRYS) 0.6 MG tablet, Take 0.6 mg by mouth 2 times daily  tiZANidine (ZANAFLEX) 4 MG tablet, Take 4 mg by mouth 3 times daily as needed  indomethacin (INDOCIN) 50 MG capsule, Take 50 mg by mouth 3 times daily as needed for Pain    Allergies   Allergen Reactions    Augmentin [Amoxicillin-Pot Clavulanate] Nausea Only     Family History   Problem Relation Age of Onset    Other Father         CAD     Social History     Tobacco Use   Smoking Status Some Days   Smokeless Tobacco Never     Social History     Substance and Sexual Activity   Drug Use Yes    Types: Marijuana (Weed)    Comment: yesterday evening     Social History     Substance and Sexual Activity   Alcohol Use Yes    Comment: occasional     ROS  Constitutional- No weight loss or fevers  Eyes- No diplopia. No photophobia.  Ears/nose/throat- No dysphagia. No Dysarthria  Cardiovascular- No palpitations. No chest pain  Respiratory- No dyspnea. No Cough  Gastrointestinal- No Abdominal pain. No Vomiting.  Genitourinary- No incontinence. No urinary retention  Musculoskeletal- No myalgia. No arthralgia  Skin- No rash. No easy bruising.  Psychiatric- No depression. No anxiety  Endocrine- No diabetes. No thyroid issues.  Hematologic- No bleeding difficulty. No fatigue  Neurologic- No weakness. No Headache.    Exam  Blood pressure 120/84, pulse 97, temperature 97.4 °F (36.3 °C), temperature source Oral, resp. rate 20, height 5' 11\" (1.803 m), weight (!) 415 lb 2 oz (188.3 kg), SpO2 96 %.  Constitutional    Vital signs: BP, HR, and RR reviewed   General alert, no distress  Eyes: unable to visualize the fundi  Cardiovascular: no peripheral edema.  Psychiatric: cooperative with examination, no psychotic behavior noted.  Neurologic  Mental status:   orientation to person, place, time.     General fund of knowledge grossly intact   Memory grossly intact   Attention intact as able to attend well to the exam     Language fluent in  conversation   Comprehension intact; follows simple commands  Cranial nerves:   CN2: visual fields full   CN 3,4,6: extraocular muscles intact  CN5: facial sensation symmetric   CN7: face symmetric without dysarthria  CN8: hearing grossly intact  CN11: trap full strength on shoulder shrug  CN12: tongue midline with protrusion  Strength: good strength in all 4 extremities   Deep tendon reflexes: normal in all 4 extremities  Sensory: patient describes sensory changes in his R elbow around up over the top portion of his forearm down to digits 1-3. Cerebellar/coordination: finger nose finger normal without ataxia  Tone: normal in all 4 extremities  Gait: deferred at this time for comfort. Labs  Glucose 170  Na 133  K 3.9  Cl 101  BUN 12  Cr 0.8  Lactic acid 2.2    Uric acid 10.1    LDL 89    TSH 2.58    ALT 18  AST 16    WBC 8.4K  Hg 13.5  Platelets 735    Flu negative  COVID negative    Studies  MRI cervical spine w/o 3/13/23, independently reviewed  Impression   1. Mild right and minimal left foraminal narrowing at C5-C6. 2. Otherwise unremarkable MRI of the cervical spine. Impression/Recommendations  RUE sensory changes. Chest pain. Dyspnea. Fall w/ ? LOC. Gout/inflammatory arthritis. The patient well defines what could possibly be a radial neuropathy. This has improved though still present. Motor function on exam is fairly well preserved. Not exactly sure what the cause of this would be. MRI of the cervical spine is noted. No significant findings. Would recommend continuing physical therapy. If he remains symptomatic in the next few weeks, could consider an EMG/NCS to help better define.       Isa River NP  [address] [place] [address] [id number] Neurology    A copy of this note was provided for Dr Mahlon Apley, MD

## 2023-03-14 NOTE — PROGRESS NOTES
New orders placed by Macy Galeana NP. See orders. Followed PRN management of elevated blood pressure. See MAR.  Electronically signed by Rashmi Sigala RN on 3/14/2023 at 3:33 AM

## 2023-03-14 NOTE — CONSULTS
Referring Physician: * No referring provider recorded for this case *  Reason for Consultation: \Second opinion  Chief Complaint: Chest pain      Subjective:   History of Present Illness:  Cash Madison is a 34 y.o. patient with morbid obesity and BMI of 57.9 kg/m² who presented to the hospital with complaints of pain in the center of the chest while he was working which he rated radiated across his chest.  He denies any significant shortness of breath but he did break out in the cold sweats. Prior to his pain he apparently also had passed out. He has not been experiencing this pain with physical exertion he had no jaw pain he had no palpitation or during the chest pain. He denies any leg edema      I have been asked to provide consultation regarding further management and testing. Review of Systems:   All 12 point review of symptoms completed. Pertinent positives identified in the HPI, all other review of symptoms negative as below. Past Medical History:   has a past medical history of Kidney disorder. Surgical History:   has a past surgical history that includes Eye surgery (2000). Social History:   reports that he has been smoking. He has never used smokeless tobacco. He reports current alcohol use. He reports current drug use. Drug: Marijuana Antonina Hikes). Family History:  family history includes Other in his father. Home Medications:  Were reviewed and are listed in nursing record and/or below  Prior to Admission medications    Medication Sig Start Date End Date Taking?  Authorizing Provider   allopurinol (ZYLOPRIM) 300 MG tablet Take 300 mg by mouth daily 8/24/21  Yes Historical Provider, MD   colchicine (COLCRYS) 0.6 MG tablet Take 0.6 mg by mouth 2 times daily 8/17/22  Yes Historical Provider, MD   tiZANidine (ZANAFLEX) 4 MG tablet Take 4 mg by mouth 3 times daily as needed 12/17/21  Yes Historical Provider, MD   indomethacin (INDOCIN) 50 MG capsule Take 50 mg by mouth 3 times daily as needed for Pain    Historical Provider, MD        CURRENT Medications:  hydrALAZINE (APRESOLINE) injection 10 mg, Q6H PRN  ciprofloxacin (CILOXAN) 0.3 % ophthalmic solution 4 drop, BID   And  dexamethasone (DECADRON) 0.1 % ophthalmic solution 4 drop, BID  indomethacin (INDOCIN) capsule 50 mg, TID WC  sodium chloride flush 0.9 % injection 5-40 mL, 2 times per day  sodium chloride flush 0.9 % injection 5-40 mL, PRN  0.9 % sodium chloride infusion, PRN  ondansetron (ZOFRAN-ODT) disintegrating tablet 4 mg, Q8H PRN   Or  ondansetron (ZOFRAN) injection 4 mg, Q6H PRN  acetaminophen (TYLENOL) tablet 650 mg, Q6H PRN   Or  acetaminophen (TYLENOL) suppository 650 mg, Q6H PRN  polyethylene glycol (GLYCOLAX) packet 17 g, Daily PRN  aspirin chewable tablet 81 mg, Daily  atorvastatin (LIPITOR) tablet 80 mg, Nightly  enoxaparin (LOVENOX) injection 60 mg, BID  allopurinol (ZYLOPRIM) tablet 300 mg, Daily  colchicine (COLCRYS) tablet 0.6 mg, BID  tiZANidine (ZANAFLEX) tablet 4 mg, TID PRN        Allergies:  Augmentin [amoxicillin-pot clavulanate]         Objective:   PHYSICAL EXAM:    Vitals:    03/14/23 0748   BP: 120/84   Pulse: 97   Resp: 20   Temp: 97.4 °F (36.3 °C)   SpO2: 96%    Weight: (!) 415 lb 2 oz (188.3 kg)         General Appearance:  Alert, cooperative, no distress, appears stated age. Head:  Normocephalic, without obvious abnormality, atraumatic. Eyes:  Pupils equal and round. No scleral icterus. Mouth: Moist mucosa, no pharyngeal erythema. Nose: Nares normal. No drainage or sinus tenderness. Neck: Supple, symmetrical, trachea midline. No adenopathy. No tenderness/mass/nodules. No carotid bruit or JVD       Lungs:   Clear to auscultation bilaterally, respirations unlabored. No wheeze, rales, or rhonchi. Chest Wall:  No tenderness or deformity. Mild chest wall pain   Heart:  Regular rate, S1/ S2 normal. No murmur, rub, or gallop. Abdomen:   Soft, non-tender, bowel sounds active.        Musculoskeletal: No muscle wasting or digital clubbing.   Extremities: Extremities normal, atraumatic. No cyanosis or edema.   Pulses: 2+ radial and pedal pulses, symmetric.   Skin: No rashes or lesions.   Pysch: Normal mood and affect. Alert and oriented x 4.   Neurologic: Normal gross motor and sensory exam.         Labs     CBC:   Lab Results   Component Value Date/Time    WBC 8.4 03/11/2023 07:50 AM    RBC 4.79 03/11/2023 07:50 AM    HGB 13.5 03/11/2023 07:50 AM    HCT 41.3 03/11/2023 07:50 AM    MCV 86.1 03/11/2023 07:50 AM    RDW 14.7 03/11/2023 07:50 AM     03/11/2023 07:50 AM     CMP:  Lab Results   Component Value Date/Time     03/10/2023 02:02 PM    K 3.9 03/10/2023 02:02 PM     03/10/2023 02:02 PM    CO2 22 03/10/2023 02:02 PM    BUN 12 03/10/2023 02:02 PM    CREATININE 0.8 03/10/2023 02:02 PM    GFRAA >60 04/05/2020 10:10 PM    AGRATIO 1.3 03/10/2023 02:02 PM    LABGLOM >60 03/10/2023 02:02 PM    GLUCOSE 170 03/10/2023 02:30 PM    PROT 7.2 03/10/2023 02:02 PM    CALCIUM 9.5 03/10/2023 02:02 PM    BILITOT 0.8 03/10/2023 02:02 PM    ALKPHOS 81 03/10/2023 02:02 PM    AST 16 03/10/2023 02:02 PM    ALT 28 03/10/2023 02:02 PM     PT/INR:  No results found for: PTINR  HgBA1c:No results found for: LABA1C  Lab Results   Component Value Date    TROPONINI <0.01 03/10/2023         Cardiac Data     Last EKG: 3/11/2023.  Personally reviewed normal sinus rhythm no acute changes    Echo: 3/11/2023 personally reviewed left ventricular cavity size is normal. Left ventricular function is normal,   LV ejection fraction estimated to be 55%   Normal diastolic function   The right ventricle is normal in size and function.   Estimated pulmonary artery systolic pressure is at 27 mmHg assuming a right   atrial pressure of 8 mmHg.   No clinically significant valvular heart disease             Assessment & Plan   1) acute chest pain  -Appears atypical for angina but with patient's strong family history, morbid obesity, elevated  blood sugar will proceed with GXT to rule out ischemic heart disease    Syncope  -Likely vasovagal in origin. Morbid obesity  -Patient BMI is 57.9 kg/m²  -I have strongly advised him to work on reducing calorie intake and be evaluated for sleep apnea's study    If patient GXT is negative, okay to send patient home from cardiac standpoint and continue to work aggressively on risk factor modification    Thank you for allowing us to participate in the care of Rell Salinas.     @[facility]@ 3/14/2023 9:48 AM  Eva [address]  3/14/2023 9:48 AM

## 2023-03-14 NOTE — PROGRESS NOTES
Patient blood pressure elevated at 164/104. Secure message sent to Dale Laura NP.  Electronically signed by Autumn Trevino RN on 3/14/2023 at 2:34 AM

## 2023-03-14 NOTE — PROGRESS NOTES
Patient alert and oriented X4. Patient stated 4 out of 10 right arm and chest pain. Patient tolerated nightly medications well. Patient verbalizes understanding of education. Patient vital signs recorded. Fall precautions in place. Bed in lowest position, side rails X2. Bed locks on. Bed alarm on. Non slip socks on. Needed items within reach. Call light within reach.  Electronically signed by Eliane Junior RN on 3/13/2023 at 10:03 PM

## 2023-03-14 NOTE — PROGRESS NOTES
D/c instructions provided to the pt. All questions answered at this time. Pt's IV removed. Awaiting ride. All belongings gathered.  Electronically signed by Cornelia Frost RN on 3/14/2023 at 4:25 PM

## 2023-03-14 NOTE — PLAN OF CARE
Problem: Discharge Planning  Goal: Discharge to home or other facility with appropriate resources  3/14/2023 1350 by Corbin Vasquez RN  Outcome: Completed  3/14/2023 0909 by Corbin Vasquez RN  Outcome: Progressing  3/14/2023 0335 by Gaudencio Cole RN  Outcome: Progressing  Flowsheets (Taken 3/13/2023 2015)  Discharge to home or other facility with appropriate resources: Identify barriers to discharge with patient and caregiver     Problem: Pain  Goal: Verbalizes/displays adequate comfort level or baseline comfort level  3/14/2023 1350 by Corbin Vasquez RN  Outcome: Completed  3/14/2023 0909 by Corbin Vasquez RN  Outcome: Progressing  3/14/2023 0335 by Gaudencio Cole RN  Outcome: Progressing     Problem: Safety - Adult  Goal: Free from fall injury  3/14/2023 1350 by Corbin Vasquez RN  Outcome: Completed  3/14/2023 0909 by Corbin Vasquez RN  Outcome: Progressing  3/14/2023 0335 by Gaudencio Cole RN  Outcome: Progressing  Flowsheets (Taken 3/14/2023 0335)  Free From Fall Injury: Instruct family/caregiver on patient safety     Problem: ABCDS Injury Assessment  Goal: Absence of physical injury  3/14/2023 1350 by Corbin Vasquez RN  Outcome: Completed  3/14/2023 0909 by Corbin Vasquez RN  Outcome: Progressing  3/14/2023 0335 by Gaudencio Cole RN  Outcome: Progressing  Flowsheets (Taken 3/14/2023 0335)  Absence of Physical Injury: Implement safety measures based on patient assessment     Problem: Neurosensory - Adult  Goal: Achieves maximal functionality and self care  3/14/2023 1350 by Corbin Vasquez RN  Outcome: Completed  3/14/2023 0909 by Corbin Vasquez RN  Outcome: Progressing  3/14/2023 0335 by Gaudencio Cole RN  Outcome: Progressing  Flowsheets (Taken 3/13/2023 2015)  Achieves maximal functionality and self care: Encourage and assist patient to increase activity and self care with guidance from physical therapy/occupational therapy     Problem: Respiratory - Adult  Goal: Achieves optimal ventilation and oxygenation  3/14/2023 1350 by Luis Nuñez RN  Outcome: Completed  3/14/2023 0909 by Luis Nuñez RN  Outcome: Progressing  3/14/2023 0335 by Eric Sow RN  Outcome: Progressing  Flowsheets (Taken 3/13/2023 2015)  Achieves optimal ventilation and oxygenation: Assess for changes in respiratory status     Problem: Cardiovascular - Adult  Goal: Maintains optimal cardiac output and hemodynamic stability  3/14/2023 1350 by Luis Nñuez RN  Outcome: Completed  3/14/2023 0909 by Luis Nuñez RN  Outcome: Progressing  3/14/2023 0335 by Eric Sow RN  Outcome: Progressing  Flowsheets (Taken 3/13/2023 2015)  Maintains optimal cardiac output and hemodynamic stability: Monitor blood pressure and heart rate  Goal: Absence of cardiac dysrhythmias or at baseline  3/14/2023 1350 by Luis Nuñez RN  Outcome: Completed  3/14/2023 0909 by Luis Nuñez RN  Outcome: Progressing  3/14/2023 0335 by Eric Sow RN  Outcome: Progressing  Flowsheets (Taken 3/13/2023 2015)  Absence of cardiac dysrhythmias or at baseline: Monitor cardiac rate and rhythm     Problem: Skin/Tissue Integrity - Adult  Goal: Skin integrity remains intact  3/14/2023 1350 by Luis Nuñez RN  Outcome: Completed  3/14/2023 0909 by Luis Nuñez RN  Outcome: Progressing  3/14/2023 0335 by Eric Sow RN  Outcome: Progressing  Flowsheets (Taken 3/13/2023 2015)  Skin Integrity Remains Intact: Monitor for areas of redness and/or skin breakdown     Problem: Musculoskeletal - Adult  Goal: Return mobility to safest level of function  3/14/2023 1350 by Luis Nuñez RN  Outcome: Completed  3/14/2023 0909 by Luis Nuñez RN  Outcome: Progressing  3/14/2023 0335 by Eric Sow RN  Outcome: Progressing  Flowsheets (Taken 3/13/2023 2015)  Return Mobility to Safest Level of Function: Assess patient stability and activity tolerance for standing, transferring and ambulating with or without assistive devices     Problem: Genitourinary - Adult  Goal: Absence of urinary retention  3/14/2023 1350 by Gloria Kay RN  Outcome: Completed  3/14/2023 0909 by Gloria Kay RN  Outcome: Progressing  3/14/2023 0335 by Arias Velázquez RN  Outcome: Progressing  Flowsheets (Taken 3/13/2023 2015)  Absence of urinary retention: Assess patients ability to void and empty bladder     Problem: Infection - Adult  Goal: Absence of infection at discharge  3/14/2023 1350 by Gloria Kay RN  Outcome: Completed  3/14/2023 0909 by Gloria Kay RN  Outcome: Progressing  3/14/2023 0335 by Arias Velázquez RN  Outcome: Progressing  Flowsheets (Taken 3/13/2023 2015)  Absence of infection at discharge: Assess and monitor for signs and symptoms of infection     Problem: Skin/Tissue Integrity  Goal: Absence of new skin breakdown  Description: 1. Monitor for areas of redness and/or skin breakdown  2. Assess vascular access sites hourly  3. Every 4-6 hours minimum:  Change oxygen saturation probe site  4. Every 4-6 hours:  If on nasal continuous positive airway pressure, respiratory therapy assess nares and determine need for appliance change or resting period.   3/14/2023 1350 by Gloria Kay RN  Outcome: Completed  3/14/2023 0909 by Gloria Kay RN  Outcome: Progressing  3/14/2023 0335 by Arias Velázquez RN  Outcome: Progressing

## 2023-03-14 NOTE — DISCHARGE SUMMARY
Hospital Medicine Discharge Summary    Patient: Jasmyn Underwood     Gender: male  : 1990   Age: 34 y.o. MRN: [id number]    Admitting Physician: Naya Ventura MD  Discharge Physician: Godwin Falk MD     Code Status: Full Code     Admit Date: 3/10/2023   Discharge Date:   3/14/23    Disposition:  Home    Discharge Diagnoses:  Chest pain-ACS ruled out. No PE on CTPA chest. Reproducible. Syncope-likely vasovagal.  R elbow pain - due to bursitis, Gout  RUE sensory loss on hands/forearm  R ear pain due to right acute otitis media. (Has left tympanic membrane perforation). Right arm pain  Morbid obesity  Lactic acidosis  Hyponatremia  Elevated D-dimer  Right acute otitis media, left tympanic membrane perforation with purulent drainage    Follow-up appointments:  have an OP sleep study/ weight loss education    Outpatient to do list: none    Condition at Discharge:  Stable    Hospital Course:   Patient is a 79-year-old male, morbidly obese was admitted for chest pain . The chest pain was associated with lightheadedness/ syncope /collapse and loss of consciousness . He has a strong family history of CAD. No shortness of breath nausea vomiting diarrhea constipation dysuria fevers chills. He had associated nausea. He also reported right arm pain and sensory loss in the right radial nerve distribution area. On exam his chest pain was reproducible. His EKG and troponins were nondiagnostic and cardiology felt the chest pain was noncardiac. The mother however was concerned due to family history of Mis. She sought a second opinion and a stress test was done but conducted and negative. He is being discharged and to follow-up with his primary care doctor he has been given contact from neurology and to follow-up with him for nerve conduction studies if the neuropathic pain does not resolve on the right upper extremity. .    Discharge Medications:   Current Discharge Medication List START taking these medications    Details   atorvastatin (LIPITOR) 80 MG tablet Take 1 tablet by mouth nightly  Qty: 30 tablet, Refills: 3      aspirin 81 MG chewable tablet Take 1 tablet by mouth daily  Qty: 30 tablet, Refills: 3           Current Discharge Medication List        Current Discharge Medication List        CONTINUE these medications which have NOT CHANGED    Details   allopurinol (ZYLOPRIM) 300 MG tablet Take 300 mg by mouth daily      colchicine (COLCRYS) 0.6 MG tablet Take 0.6 mg by mouth 2 times daily      tiZANidine (ZANAFLEX) 4 MG tablet Take 4 mg by mouth 3 times daily as needed      indomethacin (INDOCIN) 50 MG capsule Take 50 mg by mouth 3 times daily as needed for Pain           Current Discharge Medication List          Discharge ROS:  A complete review of systems was asked and negative except for above     Discharge Exam:    /84   Pulse 94   Temp 97.7 °F (36.5 °C) (Oral)   Resp 18   Ht 5' 11\" (1.803 m)   Wt (!) 415 lb 2 oz (188.3 kg)   SpO2 97%   BMI 57.90 kg/m²   General appearance: No apparent distress,   HEENT:  Conjunctivae/corneas clear. Neck: Supple, with full range of motion. Respiratory:  Normal respiratory effort. Clear to auscultation, reproducible chest pain on sternal pressure. Cardiovascular: Regular rate and rhythm with normal S1/S2 without murmurs or rubs  Abdomen: Soft, non-tender, non-distended, normal bowel sounds. Musculoskeletal: R elbow tender to touch, No cyanosis or edema bilaterally  Neurologic:  without any focal sensory/motor deficits. grossly non-focal.  Psychiatric: Alert and oriented, Normal mood  Peripheral Pulses: +2 palpable, equal bilaterally   Labs:  For convenience and continuity at follow-up the following most recent labs are provided:    Lab Results   Component Value Date/Time    WBC 8.4 03/11/2023 07:50 AM    HGB 13.5 03/11/2023 07:50 AM    HCT 41.3 03/11/2023 07:50 AM    MCV 86.1 03/11/2023 07:50 AM     03/11/2023 07:50 AM     03/10/2023 02:02 PM    K 3.9 03/10/2023 02:02 PM     03/10/2023 02:02 PM    CO2 22 03/10/2023 02:02 PM    BUN 12 03/10/2023 02:02 PM    CREATININE 0.8 03/10/2023 02:02 PM    CALCIUM 9.5 03/10/2023 02:02 PM    ALKPHOS 81 03/10/2023 02:02 PM    ALT 28 03/10/2023 02:02 PM    AST 16 03/10/2023 02:02 PM    BILITOT 0.8 03/10/2023 02:02 PM    LABALBU 4.0 03/10/2023 02:02 PM    LDLCALC 89 03/13/2023 07:50 AM    TRIG 144 03/13/2023 07:50 AM     No results found for: INR    Radiology:  XR CHEST (2 VW)    Result Date: 3/10/2023  EXAMINATION: TWO XRAY VIEWS OF THE CHEST 3/10/2023 2:18 pm COMPARISON: April 5, 2020 HISTORY: ORDERING SYSTEM PROVIDED HISTORY: CP TECHNOLOGIST PROVIDED HISTORY: Reason for exam:->CP Reason for Exam: CP FINDINGS: Normal heart size.  Haziness in the lungs could be due to patient's body habitus versus minimal congestion.  No pneumothorax.  Stable bony structures.     Haziness in the lungs could be partly from patient's body habitus versus minimal congestion.  No focal consolidation.  No pneumothorax.     XR ELBOW RIGHT (MIN 3 VIEWS)    Result Date: 3/11/2023  EXAMINATION: THREE XRAY VIEWS OF THE RIGHT ELBOW 3/11/2023 11:32 am COMPARISON: None. HISTORY: ORDERING SYSTEM PROVIDED HISTORY: Elbow TECHNOLOGIST PROVIDED HISTORY: Reason for exam:->Elbow Reason for Exam: Pain NKI Relevant Medical/Surgical History: Hx of gout FINDINGS: The radiocapitellar and ulnotrochlear joints are intact.  No fracture or dislocation.  No joint effusion. Soft tissue swelling is seen posterior to the elbow in the region of the olecranon fossa, with edema extending in the subcutaneous fat both proximally and distally. Within the posterior soft tissues of the upper extremity at the level of the mid to distal humerus, there are foci of lucency, seen both on the lateral and AP examination.     Posterior soft tissue swelling, probably olecranon bursitis, with additional edema extending both proximally and distally.  Focal lucencies within the posterior soft tissues at the level of the mid distal humerus, medial unclear. These could potentially be superficial the patient, but soft tissue gas must be considered as well. If there is any evidence of necrotizing infection, CT would be recommended. MRI CERVICAL SPINE WO CONTRAST    Result Date: 3/13/2023  EXAMINATION: MRI OF THE CERVICAL SPINE WITHOUT CONTRAST 3/13/2023 3:57 pm TECHNIQUE: Multiplanar multisequence MRI of the cervical spine was performed without the administration of intravenous contrast. COMPARISON: None. HISTORY: ORDERING SYSTEM PROVIDED HISTORY: RUE/fingers/forearm sensory loss TECHNOLOGIST PROVIDED HISTORY: Reason for exam:->RUE/fingers/forearm sensory loss Reason for Exam: RUE/fingers/forearm sensory loss FINDINGS: BONES/ALIGNMENT: There is straightening of the cervical spine with loss of the normal lordotic curvature. There is no ngozi or retrolisthesis. There is motion artifact. No areas of marrow edema to suggest an acute fracture. SPINAL CORD: The visualized posterior fossa structures are unremarkable. The cervical spinal cord demonstrates normal signal intensity without evidence of an expansile mass lesion. SOFT TISSUES: No paraspinal mass identified. C2-C3: There is no disc herniation or central spinal canal stenosis. Asymmetric left-sided uncovertebral spurring. No foraminal narrowing. C3-C4: There is no disc herniation, foraminal narrowing, or central spinal canal stenosis. C4-C5: There is no significant disc protrusion, spinal canal stenosis or neural foraminal narrowing. C5-C6: There is a minimal disc bulge indenting the anterior thecal sac. Asymmetric right-sided uncovertebral spurring with mild right foraminal narrowing. Minimal left foraminal narrowing. C6-C7: There is no significant disc protrusion, spinal canal stenosis or neural foraminal narrowing.  C7-T1: There is no significant disc protrusion, spinal canal stenosis or neural foraminal narrowing. 1. Mild right and minimal left foraminal narrowing at C5-C6. 2. Otherwise unremarkable MRI of the cervical spine. CT CHEST PULMONARY EMBOLISM W CONTRAST    Result Date: 3/10/2023  EXAMINATION: CTA OF THE CHEST 3/10/2023 4:05 pm TECHNIQUE: CTA of the chest was performed after the administration of intravenous contrast.  Multiplanar reformatted images are provided for review. MIP images are provided for review. Automated exposure control, iterative reconstruction, and/or weight based adjustment of the mA/kV was utilized to reduce the radiation dose to as low as reasonably achievable. COMPARISON: CT PA dated 04/05/2020 HISTORY: ORDERING SYSTEM PROVIDED HISTORY: elevated ddimer TECHNOLOGIST PROVIDED HISTORY: Reason for exam:->elevated ddimer Decision Support Exception - unselect if not a suspected or confirmed emergency medical condition->Emergency Medical Condition (MA) Reason for Exam: elevated ddimer FINDINGS: Pulmonary Arteries: Pulmonary arteries are adequately opacified for evaluation. No evidence of intraluminal filling defect to suggest pulmonary embolism. Main pulmonary artery is normal in caliber. Mediastinum: No evidence of suspicious mediastinal lymphadenopathy. Partially calcified subcarinal and left hilar lymph nodes are similar compared to prior examination and consistent with sequelae from granulomatous disease. The heart and pericardium demonstrate no acute abnormality. There is no acute abnormality of the thoracic aorta. Lungs/pleura: The lungs are without acute process. No focal consolidation or pulmonary edema. No evidence of pleural effusion or pneumothorax. Upper Abdomen: Limited images of the upper abdomen are unremarkable. Soft Tissues/Bones: No acute bone or soft tissue abnormality. No evidence of pulmonary embolism or acute pulmonary abnormality.        EKG     Rhythm: normal sinus   Rate: normal  Clinical Impression: no acute changes        The patient was seen and examined on day of discharge and this discharge summary is in conjunction with any daily progress note from day of discharge. Time Spent on discharge is 30 minutes  in the examination, evaluation, counseling and review of medications and discharge plan. Note that more than 30 minutes was spent in preparing discharge papers, discussing discharge with patient, medication review, etc.       Signed:    Melissa Stern MD   3/14/2023      Thank you Amaury Gann for the opportunity to be involved in this patient's care.  If you have any questions or concerns please feel free to contact me at [facility]

## 2023-03-21 ENCOUNTER — OFFICE VISIT (OUTPATIENT)
Dept: SLEEP MEDICINE | Age: 33
End: 2023-03-21

## 2023-03-21 VITALS
WEIGHT: 315 LBS | DIASTOLIC BLOOD PRESSURE: 86 MMHG | RESPIRATION RATE: 18 BRPM | BODY MASS INDEX: 44.1 KG/M2 | HEIGHT: 71 IN | HEART RATE: 127 BPM | OXYGEN SATURATION: 95 % | TEMPERATURE: 97.3 F | SYSTOLIC BLOOD PRESSURE: 136 MMHG

## 2023-03-21 DIAGNOSIS — G47.33 OSA (OBSTRUCTIVE SLEEP APNEA): Primary | ICD-10-CM

## 2023-03-21 DIAGNOSIS — R09.81 NASAL CONGESTION: ICD-10-CM

## 2023-03-21 DIAGNOSIS — E66.01 CLASS 3 SEVERE OBESITY DUE TO EXCESS CALORIES WITH SERIOUS COMORBIDITY AND BODY MASS INDEX (BMI) OF 50.0 TO 59.9 IN ADULT (HCC): ICD-10-CM

## 2023-03-21 DIAGNOSIS — J34.3 NASAL TURBINATE HYPERTROPHY: ICD-10-CM

## 2023-03-21 PROCEDURE — 99204 OFFICE O/P NEW MOD 45 MIN: CPT | Performed by: PSYCHIATRY & NEUROLOGY

## 2023-03-21 RX ORDER — FLUTICASONE PROPIONATE 50 MCG
2 SPRAY, SUSPENSION (ML) NASAL DAILY
Qty: 16 G | Refills: 5 | Status: SHIPPED | OUTPATIENT
Start: 2023-03-21

## 2023-03-21 ASSESSMENT — SLEEP AND FATIGUE QUESTIONNAIRES
HOW LIKELY ARE YOU TO NOD OFF OR FALL ASLEEP WHEN YOU ARE A PASSENGER IN A CAR FOR AN HOUR WITHOUT A BREAK: 3
HOW LIKELY ARE YOU TO NOD OFF OR FALL ASLEEP WHILE SITTING QUIETLY AFTER LUNCH WITHOUT ALCOHOL: 3
HOW LIKELY ARE YOU TO NOD OFF OR FALL ASLEEP IN A CAR, WHILE STOPPED FOR A FEW MINUTES IN TRAFFIC: 0
HOW LIKELY ARE YOU TO NOD OFF OR FALL ASLEEP WHILE LYING DOWN TO REST IN THE AFTERNOON WHEN CIRCUMSTANCES PERMIT: 3
NECK CIRCUMFERENCE (INCHES): 19.5
ESS TOTAL SCORE: 18
HOW LIKELY ARE YOU TO NOD OFF OR FALL ASLEEP WHILE SITTING INACTIVE IN A PUBLIC PLACE: 3
HOW LIKELY ARE YOU TO NOD OFF OR FALL ASLEEP WHILE SITTING AND READING: 3
HOW LIKELY ARE YOU TO NOD OFF OR FALL ASLEEP WHILE WATCHING TV: 3
HOW LIKELY ARE YOU TO NOD OFF OR FALL ASLEEP WHILE SITTING AND TALKING TO SOMEONE: 0

## 2023-03-21 ASSESSMENT — ENCOUNTER SYMPTOMS
GASTROINTESTINAL NEGATIVE: 1
WHEEZING: 1
EYES NEGATIVE: 1
ALLERGIC/IMMUNOLOGIC NEGATIVE: 1
APNEA: 1
CHOKING: 1

## 2023-03-21 NOTE — PROGRESS NOTES
MD SUKHDEEP Rowe Board Certified in Sleep Medicine  Certified [facility] Sleep Medicine  Board Certified in Neurology [address] [facility] [address] [place], [facility] [address]  [address], [address]           [address] [facility] SLEEP MEDICINE [place] [address] [facility] [address] [phone]    Subjective:     Patient ID: Mary Henao is a 34 y.o. male. Chief Complaint   Patient presents with    Establish Care    Sleep Apnea    Snoring         HPI:        Mary Henao is a 34 y.o. male referred by [address] for a sleep evaluation. He complains of snoring, snorting, choking, periods of not breathing, tossing and turning, excessive daytime sleepiness, feels sleepy during the day witnessed apnea while in the hospital but he denies knees buckling with laughing, completely or partially paralyzed while falling asleep or waking up, noisy environment, uncomfortable room temperature, uncomfortable bedding. Symptoms began several years ago, gradually worsening since that time. The patient's bed-partner confirmed the snoring and stopped breathing at night. SLEEP SCHEDULE: Goes to bed around 1-2 AM in the weekdays and 1-2 AM in the weekends. It usually takes the patient 15-20 minutes to fall asleep. The patient gets up 2-3 per night to go to the bathroom. The Patient finally gets up at 9 AM during the weekdays and 9 AM in the weekends. patient wakes up with dry mouth. The patient has restless sleep with frequent arousals in addition to the Patient has significant daytime sleepiness. The Patient scored [place] Sleepiness Score: 18 on [place] Sleepiness Scale ( more than 10 is indicative of daytime sleepiness)and 57 in fatigue scale ( more than 36 is indicative of daytime fatigue).  The patient takes 3-4 naps/week for 120 minutes

## 2023-04-05 ENCOUNTER — HOSPITAL ENCOUNTER (OUTPATIENT)
Dept: SLEEP CENTER | Age: 33
Discharge: HOME | End: 2023-04-05

## 2023-04-05 DIAGNOSIS — G47.33 OSA (OBSTRUCTIVE SLEEP APNEA): ICD-10-CM

## 2023-04-05 PROCEDURE — 95806 SLEEP STUDY UNATT&RESP EFFT: CPT

## 2023-04-10 PROBLEM — G47.33 OSA (OBSTRUCTIVE SLEEP APNEA): Status: ACTIVE | Noted: 2023-04-10

## 2023-04-11 ENCOUNTER — TELEPHONE (OUTPATIENT)
Dept: PULMONOLOGY | Age: 33
End: 2023-04-11

## 2023-04-18 PROCEDURE — 93272 ECG/REVIEW INTERPRET ONLY: CPT | Performed by: INTERNAL MEDICINE

## 2023-04-19 ENCOUNTER — TELEPHONE (OUTPATIENT)
Dept: CARDIOLOGY CLINIC | Age: 33
End: 2023-04-19

## 2023-04-21 DIAGNOSIS — R55 SYNCOPE AND COLLAPSE: Primary | ICD-10-CM

## 2023-04-21 DIAGNOSIS — R55 VASOVAGAL SYNCOPE: ICD-10-CM

## 2023-05-24 ENCOUNTER — HOSPITAL ENCOUNTER (EMERGENCY)
Age: 33
Discharge: HOME | End: 2023-05-25
Attending: EMERGENCY MEDICINE

## 2023-05-24 ENCOUNTER — APPOINTMENT (OUTPATIENT)
Dept: GENERAL RADIOLOGY | Age: 33
End: 2023-05-24

## 2023-05-24 DIAGNOSIS — R06.02 SHORTNESS OF BREATH: Primary | ICD-10-CM

## 2023-05-24 LAB
REASON FOR REJECTION: NORMAL
REJECTED TEST: NORMAL

## 2023-05-24 PROCEDURE — 84484 ASSAY OF TROPONIN QUANT: CPT

## 2023-05-24 PROCEDURE — 96360 HYDRATION IV INFUSION INIT: CPT

## 2023-05-24 PROCEDURE — 87635 SARS-COV-2 COVID-19 AMP PRB: CPT

## 2023-05-24 PROCEDURE — 99285 EMERGENCY DEPT VISIT HI MDM: CPT

## 2023-05-24 PROCEDURE — 71046 X-RAY EXAM CHEST 2 VIEWS: CPT

## 2023-05-24 PROCEDURE — 85025 COMPLETE CBC W/AUTO DIFF WBC: CPT

## 2023-05-24 PROCEDURE — 36415 COLL VENOUS BLD VENIPUNCTURE: CPT

## 2023-05-24 PROCEDURE — 93005 ELECTROCARDIOGRAM TRACING: CPT | Performed by: EMERGENCY MEDICINE

## 2023-05-24 PROCEDURE — 2580000003 HC RX 258: Performed by: EMERGENCY MEDICINE

## 2023-05-24 PROCEDURE — 83880 ASSAY OF NATRIURETIC PEPTIDE: CPT

## 2023-05-24 PROCEDURE — 80048 BASIC METABOLIC PNL TOTAL CA: CPT

## 2023-05-24 PROCEDURE — 87804 INFLUENZA ASSAY W/OPTIC: CPT

## 2023-05-24 RX ORDER — 0.9 % SODIUM CHLORIDE 0.9 %
1000 INTRAVENOUS SOLUTION INTRAVENOUS ONCE
Status: COMPLETED | OUTPATIENT
Start: 2023-05-24 | End: 2023-05-25

## 2023-05-24 RX ADMIN — SODIUM CHLORIDE 1000 ML: 9 INJECTION, SOLUTION INTRAVENOUS at 23:32

## 2023-05-25 ENCOUNTER — APPOINTMENT (OUTPATIENT)
Dept: CT IMAGING | Age: 33
End: 2023-05-25

## 2023-05-25 VITALS
RESPIRATION RATE: 15 BRPM | HEART RATE: 111 BPM | HEIGHT: 71 IN | DIASTOLIC BLOOD PRESSURE: 95 MMHG | SYSTOLIC BLOOD PRESSURE: 145 MMHG | OXYGEN SATURATION: 98 % | WEIGHT: 315 LBS | BODY MASS INDEX: 44.1 KG/M2 | TEMPERATURE: 98.5 F

## 2023-05-25 LAB
ANION GAP SERPL CALCULATED.3IONS-SCNC: 12 MMOL/L (ref 3–16)
BASOPHILS # BLD: 0 K/UL (ref 0–0.2)
BASOPHILS NFR BLD: 0.4 %
BUN SERPL-MCNC: 10 MG/DL (ref 7–20)
CALCIUM SERPL-MCNC: 9.4 MG/DL (ref 8.3–10.6)
CHLORIDE SERPL-SCNC: 98 MMOL/L (ref 99–110)
CO2 SERPL-SCNC: 22 MMOL/L (ref 21–32)
CREAT SERPL-MCNC: 1 MG/DL (ref 0.9–1.3)
DEPRECATED RDW RBC AUTO: 15.7 % (ref 12.4–15.4)
EKG ATRIAL RATE: 119 BPM
EKG DIAGNOSIS: NORMAL
EKG P AXIS: 29 DEGREES
EKG P-R INTERVAL: 140 MS
EKG Q-T INTERVAL: 320 MS
EKG QRS DURATION: 94 MS
EKG QTC CALCULATION (BAZETT): 450 MS
EKG R AXIS: 25 DEGREES
EKG T AXIS: 32 DEGREES
EKG VENTRICULAR RATE: 119 BPM
EOSINOPHIL # BLD: 0.2 K/UL (ref 0–0.6)
EOSINOPHIL NFR BLD: 1.9 %
FLUAV RNA UPPER RESP QL NAA+PROBE: NEGATIVE
FLUBV AG NPH QL: NEGATIVE
GFR SERPLBLD CREATININE-BSD FMLA CKD-EPI: >60 ML/MIN/{1.73_M2}
GLUCOSE SERPL-MCNC: 101 MG/DL (ref 70–99)
HCT VFR BLD AUTO: 39.9 % (ref 40.5–52.5)
HGB BLD-MCNC: 13.4 G/DL (ref 13.5–17.5)
LYMPHOCYTES # BLD: 1.6 K/UL (ref 1–5.1)
LYMPHOCYTES NFR BLD: 15 %
MCH RBC QN AUTO: 28.1 PG (ref 26–34)
MCHC RBC AUTO-ENTMCNC: 33.5 G/DL (ref 31–36)
MCV RBC AUTO: 83.9 FL (ref 80–100)
MONOCYTES # BLD: 1.3 K/UL (ref 0–1.3)
MONOCYTES NFR BLD: 11.5 %
NEUTROPHILS # BLD: 7.8 K/UL (ref 1.7–7.7)
NEUTROPHILS NFR BLD: 71.2 %
NT-PROBNP SERPL-MCNC: <36 PG/ML (ref 0–124)
PLATELET # BLD AUTO: 195 K/UL (ref 135–450)
PMV BLD AUTO: 8.9 FL (ref 5–10.5)
POTASSIUM SERPL-SCNC: 4.2 MMOL/L (ref 3.5–5.1)
RBC # BLD AUTO: 4.75 M/UL (ref 4.2–5.9)
SARS-COV-2 RDRP RESP QL NAA+PROBE: NOT DETECTED
SODIUM SERPL-SCNC: 132 MMOL/L (ref 136–145)
TROPONIN, HIGH SENSITIVITY: 8 NG/L (ref 0–22)
WBC # BLD AUTO: 11 K/UL (ref 4–11)

## 2023-05-25 PROCEDURE — 93010 ELECTROCARDIOGRAM REPORT: CPT | Performed by: INTERNAL MEDICINE

## 2023-05-25 PROCEDURE — 6360000004 HC RX CONTRAST MEDICATION: Performed by: EMERGENCY MEDICINE

## 2023-05-25 PROCEDURE — 71260 CT THORAX DX C+: CPT

## 2023-05-25 RX ADMIN — IOPAMIDOL 75 ML: 755 INJECTION, SOLUTION INTRAVENOUS at 00:16

## 2023-05-25 NOTE — ED NOTES
Discharge and education instructions reviewed. Patient verbalized understanding, teach-back successful. Patient denied questions at this time. No acute distress noted. Patient instructed to follow-up as noted - return to emergency department if symptoms worsen. Patient verbalized understanding. Discharged per EDMD with discharge instructions.         Flynn Can RN  05/25/23 9962

## 2023-05-25 NOTE — ED PROVIDER NOTES
I PERSONALLY SAW THE PATIENT AND PERFORMED A SUBSTANTIVE PORTION OF THE VISIT INCLUDING ALL ASPECTS OF THE MEDICAL DECISION MAKING PROCESS. [address]      Pt Name: Josep Dang  MRN: [id number]  Abdiel 1990  Date of evaluation: 5/24/2023  Provider: Georgi Land MD    CHIEF COMPLAINT       Chief Complaint   Patient presents with    Shortness of Breath     States feels like throat is swelling up, body aches, chills, cold sweats, felt like he was going to pass out, chest pain that began while he was watching tv about an hour ago; states he think he may have passed out, he woke up to his wife and sister calling his name; did not hit his head       HISTORY OF PRESENT ILLNESS    Josep Dang is a 34 y.o. male who presents to the emergency department with shortness of breath. Patient endorses shortness of breath started earlier tonight. Patient endorses for the last few days has felt like he has had a viral infection. Positive for body aches, chills, fevers, sweats. States tonight felt so poorly he developed shortness of breath, chest pain and subsequently passed out. States he feels significant better since getting to the emergency room. No other associated symptoms. No rash    Nursing Notes were reviewed. Including nursing noted for FM, Surgical History, Past Medical History, Social History, vitals, and allergies; agree with all. REVIEW OF SYSTEMS       Review of Systems    Except as noted above the remainder of the review of systems was reviewed and negative.      PAST MEDICAL HISTORY     Past Medical History:   Diagnosis Date    Kidney disorder     patient has a horseshoe shaped kidney       SURGICAL HISTORY       Past Surgical History:   Procedure Laterality Date    EYE SURGERY  2000       CURRENT MEDICATIONS       Previous Medications    ALLOPURINOL (ZYLOPRIM) 300 MG TABLET    Take 300 mg by mouth daily    ASPIRIN 81 MG

## 2023-08-20 ENCOUNTER — HOSPITAL ENCOUNTER (INPATIENT)
Age: 33
LOS: 1 days | Discharge: HOME | DRG: 310 | End: 2023-08-21
Attending: EMERGENCY MEDICINE | Admitting: SURGERY
Payer: MEDICAID

## 2023-08-20 ENCOUNTER — APPOINTMENT (OUTPATIENT)
Dept: GENERAL RADIOLOGY | Age: 33
DRG: 310 | End: 2023-08-20
Payer: MEDICAID

## 2023-08-20 ENCOUNTER — APPOINTMENT (OUTPATIENT)
Dept: CT IMAGING | Age: 33
DRG: 310 | End: 2023-08-20
Payer: MEDICAID

## 2023-08-20 DIAGNOSIS — R55 NEAR SYNCOPE: Primary | ICD-10-CM

## 2023-08-20 DIAGNOSIS — D72.829 LEUKOCYTOSIS, UNSPECIFIED TYPE: ICD-10-CM

## 2023-08-20 DIAGNOSIS — R00.0 TACHYCARDIA: ICD-10-CM

## 2023-08-20 LAB
ALBUMIN SERPL-MCNC: 3.6 G/DL (ref 3.4–5)
ALBUMIN/GLOB SERPL: 1 {RATIO} (ref 1.1–2.2)
ALP SERPL-CCNC: 77 U/L (ref 40–129)
ALT SERPL-CCNC: 20 U/L (ref 10–40)
ANION GAP SERPL CALCULATED.3IONS-SCNC: 15 MMOL/L (ref 3–16)
AST SERPL-CCNC: 13 U/L (ref 15–37)
BILIRUB SERPL-MCNC: 1.3 MG/DL (ref 0–1)
BUN SERPL-MCNC: 12 MG/DL (ref 7–20)
CALCIUM SERPL-MCNC: 10.2 MG/DL (ref 8.3–10.6)
CHLORIDE SERPL-SCNC: 101 MMOL/L (ref 99–110)
CO2 SERPL-SCNC: 19 MMOL/L (ref 21–32)
CREAT SERPL-MCNC: 1.2 MG/DL (ref 0.9–1.3)
D DIMER: 1.63 UG/ML FEU (ref 0–0.6)
GFR SERPLBLD CREATININE-BSD FMLA CKD-EPI: >60 ML/MIN/{1.73_M2}
GLUCOSE SERPL-MCNC: 132 MG/DL (ref 70–99)
POTASSIUM SERPL-SCNC: 4 MMOL/L (ref 3.5–5.1)
PROT SERPL-MCNC: 7.3 G/DL (ref 6.4–8.2)
SODIUM SERPL-SCNC: 135 MMOL/L (ref 136–145)
TROPONIN, HIGH SENSITIVITY: 14 NG/L (ref 0–22)

## 2023-08-20 PROCEDURE — 84484 ASSAY OF TROPONIN QUANT: CPT

## 2023-08-20 PROCEDURE — 99285 EMERGENCY DEPT VISIT HI MDM: CPT

## 2023-08-20 PROCEDURE — 85379 FIBRIN DEGRADATION QUANT: CPT

## 2023-08-20 PROCEDURE — 96361 HYDRATE IV INFUSION ADD-ON: CPT

## 2023-08-20 PROCEDURE — 6360000004 HC RX CONTRAST MEDICATION: Performed by: PHYSICIAN ASSISTANT

## 2023-08-20 PROCEDURE — 2580000003 HC RX 258: Performed by: PHYSICIAN ASSISTANT

## 2023-08-20 PROCEDURE — 6360000002 HC RX W HCPCS: Performed by: PHYSICIAN ASSISTANT

## 2023-08-20 PROCEDURE — 80053 COMPREHEN METABOLIC PANEL: CPT

## 2023-08-20 PROCEDURE — 96374 THER/PROPH/DIAG INJ IV PUSH: CPT

## 2023-08-20 PROCEDURE — 71046 X-RAY EXAM CHEST 2 VIEWS: CPT

## 2023-08-20 PROCEDURE — 93005 ELECTROCARDIOGRAM TRACING: CPT | Performed by: EMERGENCY MEDICINE

## 2023-08-20 PROCEDURE — 1200000000 HC SEMI PRIVATE

## 2023-08-20 PROCEDURE — 85025 COMPLETE CBC W/AUTO DIFF WBC: CPT

## 2023-08-20 PROCEDURE — 71260 CT THORAX DX C+: CPT

## 2023-08-20 PROCEDURE — 6370000000 HC RX 637 (ALT 250 FOR IP): Performed by: PHYSICIAN ASSISTANT

## 2023-08-20 RX ORDER — 0.9 % SODIUM CHLORIDE 0.9 %
1000 INTRAVENOUS SOLUTION INTRAVENOUS ONCE
Status: COMPLETED | OUTPATIENT
Start: 2023-08-20 | End: 2023-08-20

## 2023-08-20 RX ORDER — HYDROCODONE BITARTRATE AND ACETAMINOPHEN 5; 325 MG/1; MG/1
1 TABLET ORAL ONCE
Status: COMPLETED | OUTPATIENT
Start: 2023-08-20 | End: 2023-08-20

## 2023-08-20 RX ORDER — KETOROLAC TROMETHAMINE 15 MG/ML
15 INJECTION, SOLUTION INTRAMUSCULAR; INTRAVENOUS ONCE
Status: COMPLETED | OUTPATIENT
Start: 2023-08-20 | End: 2023-08-20

## 2023-08-20 RX ORDER — 0.9 % SODIUM CHLORIDE 0.9 %
1000 INTRAVENOUS SOLUTION INTRAVENOUS ONCE
Status: COMPLETED | OUTPATIENT
Start: 2023-08-20 | End: 2023-08-21

## 2023-08-20 RX ADMIN — IOPAMIDOL 75 ML: 755 INJECTION, SOLUTION INTRAVENOUS at 22:27

## 2023-08-20 RX ADMIN — KETOROLAC TROMETHAMINE 15 MG: 15 INJECTION, SOLUTION INTRAMUSCULAR; INTRAVENOUS at 21:38

## 2023-08-20 RX ADMIN — HYDROCODONE BITARTRATE AND ACETAMINOPHEN 1 TABLET: 5; 325 TABLET ORAL at 22:58

## 2023-08-20 RX ADMIN — SODIUM CHLORIDE 1000 ML: 9 INJECTION, SOLUTION INTRAVENOUS at 21:37

## 2023-08-20 ASSESSMENT — PAIN SCALES - GENERAL
PAINLEVEL_OUTOF10: 7
PAINLEVEL_OUTOF10: 7
PAINLEVEL_OUTOF10: 9

## 2023-08-20 ASSESSMENT — PAIN DESCRIPTION - ORIENTATION: ORIENTATION: RIGHT

## 2023-08-20 ASSESSMENT — PAIN - FUNCTIONAL ASSESSMENT: PAIN_FUNCTIONAL_ASSESSMENT: 0-10

## 2023-08-20 ASSESSMENT — PAIN DESCRIPTION - LOCATION: LOCATION: KNEE;ANKLE;FOOT

## 2023-08-20 NOTE — ED TRIAGE NOTES
Pt states he was working and had syncopal episode that was brief that happened while he was working. Pt states he did not hit his head, and sat down while working. Pt states that he has severe right knee and ankle pain and thinks that contributed to his feeling like he was passing out.

## 2023-08-21 VITALS
OXYGEN SATURATION: 96 % | BODY MASS INDEX: 44.1 KG/M2 | TEMPERATURE: 97.8 F | WEIGHT: 315 LBS | DIASTOLIC BLOOD PRESSURE: 73 MMHG | RESPIRATION RATE: 18 BRPM | HEIGHT: 71 IN | SYSTOLIC BLOOD PRESSURE: 134 MMHG | HEART RATE: 79 BPM

## 2023-08-21 LAB
ANION GAP SERPL CALCULATED.3IONS-SCNC: 11 MMOL/L (ref 3–16)
BASOPHILS # BLD: 0 K/UL (ref 0–0.2)
BASOPHILS NFR BLD: 0 %
BUN SERPL-MCNC: 12 MG/DL (ref 7–20)
CALCIUM SERPL-MCNC: 9 MG/DL (ref 8.3–10.6)
CHLORIDE SERPL-SCNC: 104 MMOL/L (ref 99–110)
CO2 SERPL-SCNC: 22 MMOL/L (ref 21–32)
CREAT SERPL-MCNC: 0.9 MG/DL (ref 0.9–1.3)
DEPRECATED RDW RBC AUTO: 16.5 % (ref 12.4–15.4)
EKG ATRIAL RATE: 140 BPM
EKG DIAGNOSIS: NORMAL
EKG P AXIS: 33 DEGREES
EKG P-R INTERVAL: 116 MS
EKG Q-T INTERVAL: 302 MS
EKG QRS DURATION: 90 MS
EKG QTC CALCULATION (BAZETT): 461 MS
EKG R AXIS: 26 DEGREES
EKG T AXIS: 52 DEGREES
EKG VENTRICULAR RATE: 140 BPM
EOSINOPHIL # BLD: 0 K/UL (ref 0–0.6)
EOSINOPHIL NFR BLD: 0 %
EST. AVERAGE GLUCOSE BLD GHB EST-MCNC: 125.5 MG/DL
GFR SERPLBLD CREATININE-BSD FMLA CKD-EPI: >60 ML/MIN/{1.73_M2}
GLUCOSE SERPL-MCNC: 101 MG/DL (ref 70–99)
HBA1C MFR BLD: 6 %
HCT VFR BLD AUTO: 43.8 % (ref 40.5–52.5)
HGB BLD-MCNC: 14.3 G/DL (ref 13.5–17.5)
LYMPHOCYTES # BLD: 2.5 K/UL (ref 1–5.1)
LYMPHOCYTES NFR BLD: 11 %
MCH RBC QN AUTO: 27.2 PG (ref 26–34)
MCHC RBC AUTO-ENTMCNC: 32.5 G/DL (ref 31–36)
MCV RBC AUTO: 83.5 FL (ref 80–100)
MONOCYTES # BLD: 1.8 K/UL (ref 0–1.3)
MONOCYTES NFR BLD: 8 %
MYELOCYTES NFR BLD MANUAL: 2 %
NEUTROPHILS # BLD: 18.5 K/UL (ref 1.7–7.7)
NEUTROPHILS NFR BLD: 79 %
PATH INTERP BLD-IMP: NO
PLATELET # BLD AUTO: 280 K/UL (ref 135–450)
PLATELET BLD QL SMEAR: ADEQUATE
PMV BLD AUTO: 9.2 FL (ref 5–10.5)
POTASSIUM SERPL-SCNC: 3.7 MMOL/L (ref 3.5–5.1)
RBC # BLD AUTO: 5.25 M/UL (ref 4.2–5.9)
RBC MORPH BLD: NORMAL
SLIDE REVIEW: ABNORMAL
SODIUM SERPL-SCNC: 137 MMOL/L (ref 136–145)
WBC # BLD AUTO: 22.9 K/UL (ref 4–11)

## 2023-08-21 PROCEDURE — 83036 HEMOGLOBIN GLYCOSYLATED A1C: CPT

## 2023-08-21 PROCEDURE — 94760 N-INVAS EAR/PLS OXIMETRY 1: CPT

## 2023-08-21 PROCEDURE — 6370000000 HC RX 637 (ALT 250 FOR IP): Performed by: SURGERY

## 2023-08-21 PROCEDURE — 36415 COLL VENOUS BLD VENIPUNCTURE: CPT

## 2023-08-21 PROCEDURE — 80048 BASIC METABOLIC PNL TOTAL CA: CPT

## 2023-08-21 PROCEDURE — 2580000003 HC RX 258: Performed by: PHYSICIAN ASSISTANT

## 2023-08-21 PROCEDURE — 2580000003 HC RX 258: Performed by: SURGERY

## 2023-08-21 PROCEDURE — 6360000002 HC RX W HCPCS: Performed by: SURGERY

## 2023-08-21 RX ORDER — ALLOPURINOL 300 MG/1
300 TABLET ORAL DAILY
Status: DISCONTINUED | OUTPATIENT
Start: 2023-08-21 | End: 2023-08-21 | Stop reason: HOSPADM

## 2023-08-21 RX ORDER — ACETAMINOPHEN 650 MG/1
650 SUPPOSITORY RECTAL EVERY 6 HOURS PRN
Status: DISCONTINUED | OUTPATIENT
Start: 2023-08-21 | End: 2023-08-21 | Stop reason: HOSPADM

## 2023-08-21 RX ORDER — POTASSIUM CHLORIDE 7.45 MG/ML
10 INJECTION INTRAVENOUS PRN
Status: DISCONTINUED | OUTPATIENT
Start: 2023-08-21 | End: 2023-08-21 | Stop reason: SDUPTHER

## 2023-08-21 RX ORDER — ENOXAPARIN SODIUM 100 MG/ML
60 INJECTION SUBCUTANEOUS 2 TIMES DAILY
Status: DISCONTINUED | OUTPATIENT
Start: 2023-08-21 | End: 2023-08-21 | Stop reason: HOSPADM

## 2023-08-21 RX ORDER — ONDANSETRON 2 MG/ML
4 INJECTION INTRAMUSCULAR; INTRAVENOUS EVERY 6 HOURS PRN
Status: DISCONTINUED | OUTPATIENT
Start: 2023-08-21 | End: 2023-08-21 | Stop reason: HOSPADM

## 2023-08-21 RX ORDER — SODIUM CHLORIDE 0.9 % (FLUSH) 0.9 %
5-40 SYRINGE (ML) INJECTION EVERY 12 HOURS SCHEDULED
Status: DISCONTINUED | OUTPATIENT
Start: 2023-08-21 | End: 2023-08-21 | Stop reason: HOSPADM

## 2023-08-21 RX ORDER — ONDANSETRON 4 MG/1
4 TABLET, ORALLY DISINTEGRATING ORAL EVERY 8 HOURS PRN
Status: DISCONTINUED | OUTPATIENT
Start: 2023-08-21 | End: 2023-08-21 | Stop reason: HOSPADM

## 2023-08-21 RX ORDER — POTASSIUM CHLORIDE 7.45 MG/ML
10 INJECTION INTRAVENOUS PRN
Status: DISCONTINUED | OUTPATIENT
Start: 2023-08-21 | End: 2023-08-21 | Stop reason: HOSPADM

## 2023-08-21 RX ORDER — MAGNESIUM SULFATE IN WATER 40 MG/ML
2000 INJECTION, SOLUTION INTRAVENOUS PRN
Status: DISCONTINUED | OUTPATIENT
Start: 2023-08-21 | End: 2023-08-21 | Stop reason: HOSPADM

## 2023-08-21 RX ORDER — COLCHICINE 0.6 MG/1
0.6 TABLET ORAL 2 TIMES DAILY
Status: DISCONTINUED | OUTPATIENT
Start: 2023-08-21 | End: 2023-08-21 | Stop reason: HOSPADM

## 2023-08-21 RX ORDER — ATORVASTATIN CALCIUM 80 MG/1
80 TABLET, FILM COATED ORAL NIGHTLY
Status: DISCONTINUED | OUTPATIENT
Start: 2023-08-21 | End: 2023-08-21 | Stop reason: HOSPADM

## 2023-08-21 RX ORDER — SODIUM CHLORIDE 0.9 % (FLUSH) 0.9 %
5-40 SYRINGE (ML) INJECTION PRN
Status: DISCONTINUED | OUTPATIENT
Start: 2023-08-21 | End: 2023-08-21 | Stop reason: HOSPADM

## 2023-08-21 RX ORDER — SODIUM CHLORIDE 9 MG/ML
INJECTION, SOLUTION INTRAVENOUS PRN
Status: DISCONTINUED | OUTPATIENT
Start: 2023-08-21 | End: 2023-08-21 | Stop reason: HOSPADM

## 2023-08-21 RX ORDER — POTASSIUM CHLORIDE 20 MEQ/1
40 TABLET, EXTENDED RELEASE ORAL PRN
Status: DISCONTINUED | OUTPATIENT
Start: 2023-08-21 | End: 2023-08-21 | Stop reason: HOSPADM

## 2023-08-21 RX ORDER — POLYETHYLENE GLYCOL 3350 17 G/17G
17 POWDER, FOR SOLUTION ORAL DAILY PRN
Status: DISCONTINUED | OUTPATIENT
Start: 2023-08-21 | End: 2023-08-21 | Stop reason: HOSPADM

## 2023-08-21 RX ORDER — ASPIRIN 81 MG/1
81 TABLET, CHEWABLE ORAL DAILY
Status: DISCONTINUED | OUTPATIENT
Start: 2023-08-21 | End: 2023-08-21 | Stop reason: HOSPADM

## 2023-08-21 RX ORDER — TIZANIDINE 4 MG/1
4 TABLET ORAL EVERY 6 HOURS PRN
Status: DISCONTINUED | OUTPATIENT
Start: 2023-08-21 | End: 2023-08-21 | Stop reason: HOSPADM

## 2023-08-21 RX ORDER — ACETAMINOPHEN 325 MG/1
650 TABLET ORAL EVERY 6 HOURS PRN
Status: DISCONTINUED | OUTPATIENT
Start: 2023-08-21 | End: 2023-08-21 | Stop reason: HOSPADM

## 2023-08-21 RX ADMIN — SODIUM CHLORIDE 1000 ML: 9 INJECTION, SOLUTION INTRAVENOUS at 00:53

## 2023-08-21 RX ADMIN — COLCHICINE 0.6 MG: 0.6 TABLET, FILM COATED ORAL at 15:13

## 2023-08-21 RX ADMIN — ALLOPURINOL 300 MG: 300 TABLET ORAL at 09:19

## 2023-08-21 RX ADMIN — Medication 10 ML: at 09:20

## 2023-08-21 RX ADMIN — ENOXAPARIN SODIUM 60 MG: 100 INJECTION SUBCUTANEOUS at 09:19

## 2023-08-21 RX ADMIN — ASPIRIN 81 MG: 81 TABLET, CHEWABLE ORAL at 09:19

## 2023-08-21 RX ADMIN — TIZANIDINE 4 MG: 4 TABLET ORAL at 09:28

## 2023-08-21 RX ADMIN — ACETAMINOPHEN 650 MG: 325 TABLET ORAL at 15:14

## 2023-08-21 ASSESSMENT — PAIN SCALES - GENERAL
PAINLEVEL_OUTOF10: 8
PAINLEVEL_OUTOF10: 0
PAINLEVEL_OUTOF10: 9

## 2023-08-21 ASSESSMENT — PAIN - FUNCTIONAL ASSESSMENT
PAIN_FUNCTIONAL_ASSESSMENT: ACTIVITIES ARE NOT PREVENTED
PAIN_FUNCTIONAL_ASSESSMENT: ACTIVITIES ARE NOT PREVENTED

## 2023-08-21 ASSESSMENT — PAIN DESCRIPTION - DESCRIPTORS
DESCRIPTORS: PRESSURE
DESCRIPTORS: PRESSURE

## 2023-08-21 ASSESSMENT — LIFESTYLE VARIABLES
HOW MANY STANDARD DRINKS CONTAINING ALCOHOL DO YOU HAVE ON A TYPICAL DAY: PATIENT DOES NOT DRINK
HOW OFTEN DO YOU HAVE A DRINK CONTAINING ALCOHOL: NEVER

## 2023-08-21 ASSESSMENT — PAIN DESCRIPTION - LOCATION
LOCATION: KNEE;ANKLE
LOCATION: KNEE

## 2023-08-21 ASSESSMENT — PAIN DESCRIPTION - ORIENTATION
ORIENTATION: RIGHT
ORIENTATION: RIGHT

## 2023-08-21 NOTE — DISCHARGE INSTR - DIET

## 2023-08-21 NOTE — PROGRESS NOTES
Patient was admitted from ED with syncope and collapse. Patient states he did not hit his head. Patient is A&O x4. Vitals are stable. Patient has been oriented to room and has call light and bedside table within reach. Patient is resting in bed at the moment with the alarm activated.

## 2023-08-21 NOTE — ED NOTES
ED SBAR report provider to 4N, RN. Patient to be transported to Room 4269 via stretcher by ED tech. Patient transported with bedside cardiac monitor and with IV medications infusing. IV site clean, dry, and intact. Updated patient on plan of care.          Nighat Castro RN  08/21/23 6648

## 2023-08-21 NOTE — ED PROVIDER NOTES
[address]        Pt Name: Richard Bang  MRN: [id number]  [address] [facility] 1990  Date of evaluation: 8/20/2023  Provider: Patricia Sage PA-C  PCP: Trell Harman  Note Started: 9:04 PM EDT 8/20/23       I have seen and evaluated this patient with my supervising physician Dr. Stephen Judd   Patient presents with    Loss of Consciousness     Pt states he was working and had syncopal episode at around 4:30pm.  Pt did not hit his head, pt states he is SOB. Pt states he also has knee pain and caused him to feel like he was going to pass out. HISTORY OF PRESENT ILLNESS: 1 or more Elements     History From: patient  Limitations to history : None    Richard Bang is a 34 y.o. male who presents to the emergency department by private vehicle following a near syncopal episode. Patient states this evening he was working when he began feeling fatigued, lightheaded and as if he may pass out. He was able to sit down prior to losing consciousness. Patient reports during episode he had tunnel vision, shortness of breath and mild chest discomfort. Patient denies any recurrence of symptoms. Patient states he has had this occur every 6 to 8 weeks since COVID infection 2 years ago. He has been seen and evaluated for this multiple times previously. He has been worked up by his primary care doctor had a positive MARY. He has follow-up arranged for 8/22/2023 with a rheumatologist.  Patient states he typically receives fluids, steroids and anti-inflammatories when symptoms occur. Patient states he was recently on a 10-day course of steroids, took his last dose 2 days ago. His heart rate is elevated in the ED. He denies any chest pain, shortness of breath, lightheadedness currently. Denies any recent illness, fever, chills, cough, or pressure complaint, wounds/sores, abdominal pain, urinary symptoms.   States symptoms he experienced today

## 2023-08-21 NOTE — PROGRESS NOTES
Reviewed discharge and follow up instructions with pt and family - answered all questions - pt stable without s/sx of distress at discharge - wheelchair transport to [facility]

## 2023-08-21 NOTE — PLAN OF CARE
Problem: Discharge Planning  Goal: Discharge to home or other facility with appropriate resources  8/21/2023 1600 by Sheldon Evans RN  Outcome: Completed  8/21/2023 1502 by Sheldon Evans RN  Outcome: Progressing  8/21/2023 0232 by Leslie Briceño RN  Outcome: Progressing  Flowsheets (Taken 8/21/2023 0132)  Discharge to home or other facility with appropriate resources:   Identify barriers to discharge with patient and caregiver   Identify discharge learning needs (meds, wound care, etc)   Refer to discharge planning if patient needs post-hospital services based on physician order or complex needs related to functional status, cognitive ability or social support system   Arrange for needed discharge resources and transportation as appropriate     Problem: Safety - Adult  Goal: Free from fall injury  8/21/2023 1600 by Sheldon Evans RN  Outcome: Completed  8/21/2023 1502 by Sheldon Evans RN  Outcome: Progressing  8/21/2023 0232 by Leslie Briceño RN  Outcome: Progressing     Problem: ABCDS Injury Assessment  Goal: Absence of physical injury  8/21/2023 1600 by Sheldon Evans, RN  Outcome: Completed  8/21/2023 1502 by Sheldon Evans, RN  Outcome: Progressing  8/21/2023 0232 by Leslie Briceño RN  Outcome: Progressing     Problem: Neurosensory - Adult  Goal: Achieves stable or improved neurological status  8/21/2023 1600 by Sheldon Evans, RN  Outcome: Completed  8/21/2023 1502 by Sheldon Evans, RN  Outcome: Progressing  8/21/2023 0232 by Leslie Briceño RN  Outcome: Progressing  Goal: Absence of seizures  8/21/2023 1600 by Sheldon Evans, RN  Outcome: Completed  8/21/2023 1502 by Sheldon Evans, RN  Outcome: Progressing  8/21/2023 0232 by Leslie Briceño RN  Outcome: Progressing  Goal: Remains free of injury related to seizures activity  8/21/2023 1600 by Sheldon Evans, RN  Outcome: Completed  8/21/2023 1502 by Sheldon Evans, RN  Outcome: Progressing  8/21/2023 0232 by Leslie Briceño Progressing  8/21/2023 0232 by Aguilar Hernandez RN  Outcome: Progressing  Goal: Absence of fever/infection during anticipated neutropenic period  8/21/2023 1600 by Floyd Esquivel RN  Outcome: Completed  8/21/2023 1502 by Floyd Esquivel RN  Outcome: Progressing  8/21/2023 0232 by Aguilar Hernandez RN  Outcome: Progressing     Problem: Metabolic/Fluid and Electrolytes - Adult  Goal: Electrolytes maintained within normal limits  8/21/2023 1600 by Floyd Esquivel RN  Outcome: Completed  8/21/2023 1502 by Floyd Esquivel RN  Outcome: Progressing  8/21/2023 0232 by Aguilar Hernandez RN  Outcome: Progressing  Goal: Hemodynamic stability and optimal renal function maintained  8/21/2023 1600 by Floyd Esquivel RN  Outcome: Completed  8/21/2023 1502 by Floyd Esquivel RN  Outcome: Progressing  8/21/2023 0232 by Aguilar Hernandez RN  Outcome: Progressing  Goal: Glucose maintained within prescribed range  8/21/2023 1600 by Floyd Esquivel RN  Outcome: Completed  8/21/2023 1502 by Floyd Esquivel RN  Outcome: Progressing  8/21/2023 0232 by Aguilar Hernandez RN  Outcome: Progressing     Problem: Skin/Tissue Integrity - Adult  Goal: Skin integrity remains intact  8/21/2023 1600 by Floyd Esquivel RN  Outcome: Completed  8/21/2023 1502 by Floyd Esquivel RN  Outcome: Progressing  Goal: Incisions, wounds, or drain sites healing without S/S of infection  8/21/2023 1600 by Floyd Esquivel RN  Outcome: Completed  8/21/2023 1502 by Floyd Esquivel RN  Outcome: Progressing  Goal: Oral mucous membranes remain intact  8/21/2023 1600 by Floyd Esquivel RN  Outcome: Completed  8/21/2023 1502 by Floyd Esquivel RN  Outcome: Progressing     Problem: Pain  Goal: Verbalizes/displays adequate comfort level or baseline comfort level  8/21/2023 1600 by Floyd Esquivel RN  Outcome: Completed  8/21/2023 1502 by Floyd Esquivel RN  Outcome: Progressing

## 2023-08-21 NOTE — CARE COORDINATION
Pt has no insurance on file. States he has the [address] that is current through the end of September, pt declines any help with insurance. Denies any further needs at this time.      Electronically signed by Yuliana Sanchez RN on 8/21/2023 at 10:43 AM  Ph: [phone]  Fax: [phone]

## 2023-08-21 NOTE — ED PROVIDER NOTES
ED Attending Attestation Note    This patient was seen by the advanced practice provider. I personally saw the patient and performed a substantive portion of the visit including all aspects of the medical decision making. Briefly, 34 y.o. male who  has a past medical history of Kidney disorder. presents with complaints of a near syncopal episode. Patient states that he was working and started to have some pain in his right leg. Patient states he has history of chronic knee pain and was unsure if the pain caused him to nearly pass out. States that he started to feel shaky and had tunnel vision. States his episode happened at 36 today. Denies actually passing out but states he had to sit down because he got near syncopal.  Denies his head. States he did have shortness of breath and chest tightness. States he had an episode similar to this in the past but denies ever actually passing out. No chest pain. Denies any previous cardiac history. Patient states he is currently being worked up for possible autoimmune disease such as lupus because of his joint pain and elevated white blood cell count. Focused exam:   Gen: 34 y.o. male, NAD  HEENT: NCAT. PERRL. EOMI. CV: Tachycardic, regular rhythm w/o MRG  Lungs: CTAB. No incr WOB. Abdomen: Soft, morbidly obese, nontender, nondistended. No rebound/guarding. Neuro: GCS 15, cranial nerves II through XII intact, 5/5 strength throughout, light touch sensation grossly intact, cranial nerves II through XII intact, no dysarthria, no aphasia, no facial droop    1944  The Ekg interpreted by me shows  Sinus tachycardia with a rate of 140, normal axis, , QRS 90, QTc 461, no ST elevations, no ST depressions, nonspecific ST changes, no significant significant change compared EKG from 5/24/2023 except for increase in rate by 21 bpm      MDM:   Patient as above. Seen and evaluated. Nontoxic and afebrile.   Presents with tachycardia in the 140s and a near

## 2023-08-21 NOTE — PROGRESS NOTES
Pharmacy Medication Reconciliation Note     List of medications patient is currently taking is complete. Source of information:   1. Conversation with pt at bedside     Allergies   Allergen Reactions    Augmentin [Amoxicillin-Pot Clavulanate] Nausea Only       Notes regarding home medications:   1. Cindra Phlegm tells me he has been out of meds for > month. He lost his PCP but does have an upcoming appt with a [facility] tomorrow. 2. When he did take colchicine, it was once a day. He reports it was too expensive.       Fior Nuñez [facility]   8/21/2023  9:53 AM

## 2023-08-21 NOTE — PROGRESS NOTES
4 Eyes Skin Assessment     NAME:  Mi Meier OF BIRTH:  1990  MEDICAL RECORD NUMBER:  [id number]    The patient is being assessed for  Admission    I agree that at least one RN has performed a thorough Head to Toe Skin Assessment on the patient. ALL assessment sites listed below have been assessed. Areas assessed by both nurses:    Head, Face, Ears, Shoulders, Back, Chest, Arms, Elbows, Hands, Sacrum. Buttock, Coccyx, Ischium, and Legs. Feet and Heels        Does the Patient have a Wound?  No noted wound(s)       Lucio Prevention initiated by RN: No  Wound Care Orders initiated by RN: No    Pressure Injury (Stage 3,4, Unstageable, DTI, NWPT, and Complex wounds) if present, place Wound referral order by RN under : No    New Ostomies, if present place, Ostomy referral order under : No     Nurse 1 eSignature: Electronically signed by Gabriel Rice RN on 8/21/23 at 2:10 AM EDT    **SHARE this note so that the co-signing nurse can place an eSignature**    Nurse 2 eSignature: Electronically signed by Edmund Brennan RN on 8/21/23 at 2:14 AM EDT

## 2023-08-21 NOTE — CARE COORDINATION
Case Management Assessment  Initial Evaluation    Date/Time of Evaluation: 8/21/2023 10:36 AM  Assessment Completed by: Miladis Barber RN    If patient is discharged prior to next notation, then this note serves as note for discharge by case management. Patient Name: Francois Bailey                   YOB: 1990  Diagnosis: Syncope and collapse [R55]                   Date / Time: 8/20/2023  7:48 PM    Patient Admission Status: Inpatient   Readmission Risk (Low < 19, Mod (19-27), High > 27): Readmission Risk Score: 10    Current PCP: Tom Beasley  PCP verified by CM? (P) Yes    Chart Reviewed: Yes      History Provided by: (P) Patient  Patient Orientation: (P) Alert and Oriented, Person, Place, Situation, Self    Patient Cognition: (P) Alert    Hospitalization in the last 30 days (Readmission):  No    If yes, Readmission Assessment in  Navigator will be completed.     Advance Directives:      Code Status: Full Code   Patient's Primary Decision Maker is: (P) Legal Next of Kin    Primary Decision MakerAlicia Gunter - [phone]    Discharge Planning:    Patient lives with: (P) Family Members Type of Home: (P) House  Primary Care Giver: (P) Self  Patient Support Systems include: (P) Parent, Family Members   Current Financial resources: (P) Other (Comment) (pt has no insurance)  Current community resources: (P) None  Current services prior to admission: (P) C-pap            Current DME:              Type of Home Care services:  (P) None    ADLS  Prior functional level: (P) Independent in ADLs/IADLs  Current functional level: (P) Independent in ADLs/IADLs    PT AM-PAC:   /24  OT AM-PAC:   /24    Family can provide assistance at DC: (P) Yes  Would you like Case Management to discuss the discharge plan with any other family members/significant others, and if so, who? (P) No  Plans to Return to Present Housing: (P) Yes  Other Identified Issues/Barriers to RETURNING to current housing: None  Potential Assistance needed at discharge: (P) N/A            Potential DME:    Patient expects to discharge to: (P) [address] [facility] [place] for transportation at discharge: (P) Self    Financial    Payor: /     Does insurance require precert for SNF: Yes    Potential assistance Purchasing Medications: (P) No  Meds-to-Beds request:        [facility] [id number] - [address], [address]  [address] [facility]  Phone: [phone] Fax: [phone]    [address], [address] [facility],[address] [facility] [phone] - F [phone]  [facility] [address]  Phone: [phone] Fax: [phone]      Notes:    Factors facilitating achievement of predicted outcomes: Family support, Motivated, Cooperative, Pleasant, and Good insight into deficits    Barriers to discharge: Limited family support, No caregiver support, and Medical complications    Additional Case Management Notes: Pt lives at home with his sister, mom will transport at discharge. Pt states he doesn't have insurance at the moment but has the [employer] financial forgiveness that is good through the end of September. Pt declines insurance resources and states he has been through it already. The Plan for Transition of Care is related to the following treatment goals of Syncope and collapse [R55]    The Patient and/or Patient Representative Agree with the Discharge Plan? YES          08/21/23 1029   Service Assessment   Patient Orientation Alert and Oriented;Person;Place;Situation;Self   Cognition Alert   History Provided By Patient   Primary Caregiver Self   Accompanied By/Relationship None at bedside   Support Systems Parent; Family Members   Patient's [address] is: Legal Next of [address] [facility]   PCP Verified by CM Yes   Last Visit to PCP Within last year   Prior Functional Level Independent in ADLs/IADLs   Current Functional Level Independent in

## 2023-08-21 NOTE — PLAN OF CARE
Problem: Discharge Planning  Goal: Discharge to home or other facility with appropriate resources  Outcome: Progressing  Flowsheets (Taken 8/21/2023 0132)  Discharge to home or other facility with appropriate resources:   Identify barriers to discharge with patient and caregiver   Identify discharge learning needs (meds, wound care, etc)   Refer to discharge planning if patient needs post-hospital services based on physician order or complex needs related to functional status, cognitive ability or social support system   Arrange for needed discharge resources and transportation as appropriate     Problem: Safety - Adult  Goal: Free from fall injury  Outcome: Progressing     Problem: ABCDS Injury Assessment  Goal: Absence of physical injury  Outcome: Progressing     Problem: Neurosensory - Adult  Goal: Achieves stable or improved neurological status  Outcome: Progressing  Goal: Absence of seizures  Outcome: Progressing  Goal: Remains free of injury related to seizures activity  Outcome: Progressing  Goal: Achieves maximal functionality and self care  Outcome: Progressing     Problem: Cardiovascular - Adult  Goal: Maintains optimal cardiac output and hemodynamic stability  Outcome: Progressing  Goal: Absence of cardiac dysrhythmias or at baseline  Outcome: Progressing     Problem: Musculoskeletal - Adult  Goal: Return mobility to safest level of function  Outcome: Progressing  Goal: Maintain proper alignment of affected body part  Outcome: Progressing  Goal: Return ADL status to a safe level of function  Outcome: Progressing     Problem: Infection - Adult  Goal: Absence of infection at discharge  Outcome: Progressing  Goal: Absence of infection during hospitalization  Outcome: Progressing  Goal: Absence of fever/infection during anticipated neutropenic period  Outcome: Progressing     Problem: Metabolic/Fluid and Electrolytes - Adult  Goal: Electrolytes maintained within normal limits  Outcome: Progressing  Goal: Hemodynamic stability and optimal renal function maintained  Outcome: Progressing  Goal: Glucose maintained within prescribed range  Outcome: Progressing

## 2023-08-21 NOTE — PLAN OF CARE
Problem: Discharge Planning  Goal: Discharge to home or other facility with appropriate resources  8/21/2023 1502 by Heather Sanchez RN  Outcome: Progressing  8/21/2023 0232 by Yakov Jackson RN  Outcome: Progressing  Flowsheets (Taken 8/21/2023 0132)  Discharge to home or other facility with appropriate resources:   Identify barriers to discharge with patient and caregiver   Identify discharge learning needs (meds, wound care, etc)   Refer to discharge planning if patient needs post-hospital services based on physician order or complex needs related to functional status, cognitive ability or social support system   Arrange for needed discharge resources and transportation as appropriate     Problem: Safety - Adult  Goal: Free from fall injury  8/21/2023 1502 by Heather Sanchez RN  Outcome: Progressing  8/21/2023 0232 by Yakov Jackson RN  Outcome: Progressing     Problem: ABCDS Injury Assessment  Goal: Absence of physical injury  8/21/2023 1502 by Heather Snachez RN  Outcome: Progressing  8/21/2023 0232 by Yakov Jackson RN  Outcome: Progressing     Problem: Neurosensory - Adult  Goal: Achieves stable or improved neurological status  8/21/2023 1502 by Heather Sanchez RN  Outcome: Progressing  8/21/2023 0232 by Yakov Jackson RN  Outcome: Progressing  Goal: Absence of seizures  8/21/2023 1502 by Heather Sanchez RN  Outcome: Progressing  8/21/2023 0232 by Yakov Jackson RN  Outcome: Progressing  Goal: Remains free of injury related to seizures activity  8/21/2023 1502 by Heather Sanchez RN  Outcome: Progressing  8/21/2023 0232 by Yakov Jackson RN  Outcome: Progressing  Goal: Achieves maximal functionality and self care  8/21/2023 1502 by Heather Sanchez RN  Outcome: Progressing  8/21/2023 0232 by Yakov Jackson RN  Outcome: Progressing     Problem: Cardiovascular - Adult  Goal: Maintains optimal cardiac output and hemodynamic stability  8/21/2023 1502 by Heather Sanchez RN  Outcome:

## 2023-08-21 NOTE — PROGRESS NOTES
Pt AOX4 - pt denied n/v, sob, diarrhea - pt c/o 9/10 pain - pt had no further needs at this time - bed in lowest and locked position with bed side table and call light within reach - non skid socks on

## 2023-08-21 NOTE — CARE COORDINATION
CASE MANAGEMENT DISCHARGE SUMMARY:    DISCHARGE DATE: 08/21/2023    DISCHARGED TO: Home with family support        TRANSPORTATION: family to transport   TIME: TBD by pt and bedside RN       PREFERRED PHARMACY:     [facility] [id number] - [address] [phone]                 COMMENTS: pt to return home with family support. Mom will transport. Pt and bedside RN aware of discharge time line.      Electronically signed by Valeria Reyez RN on 8/21/2023 at 11:40 AM  Ph: [phone]  Fax: [phone]

## 2023-09-12 PROBLEM — M13.0 POLYARTHRITIS: Status: ACTIVE | Noted: 2023-09-12

## 2023-09-12 PROBLEM — M1A.09X0 CHRONIC GOUT OF MULTIPLE SITES: Status: ACTIVE | Noted: 2023-09-12

## 2023-09-12 PROBLEM — L40.3: Status: ACTIVE | Noted: 2023-09-12

## 2023-10-20 ENCOUNTER — APPOINTMENT (OUTPATIENT)
Dept: CT IMAGING | Age: 33
End: 2023-10-20
Payer: COMMERCIAL

## 2023-10-20 ENCOUNTER — HOSPITAL ENCOUNTER (EMERGENCY)
Age: 33
Discharge: HOME | End: 2023-10-20
Attending: EMERGENCY MEDICINE
Payer: COMMERCIAL

## 2023-10-20 ENCOUNTER — APPOINTMENT (OUTPATIENT)
Dept: GENERAL RADIOLOGY | Age: 33
End: 2023-10-20
Payer: COMMERCIAL

## 2023-10-20 VITALS
DIASTOLIC BLOOD PRESSURE: 72 MMHG | HEART RATE: 119 BPM | BODY MASS INDEX: 44.1 KG/M2 | OXYGEN SATURATION: 100 % | SYSTOLIC BLOOD PRESSURE: 121 MMHG | HEIGHT: 71 IN | WEIGHT: 315 LBS | TEMPERATURE: 96.9 F | RESPIRATION RATE: 19 BRPM

## 2023-10-20 DIAGNOSIS — R06.02 SHORTNESS OF BREATH: ICD-10-CM

## 2023-10-20 DIAGNOSIS — F17.200 CURRENT SMOKER: ICD-10-CM

## 2023-10-20 DIAGNOSIS — Z87.39 HISTORY OF ARTHRITIS: ICD-10-CM

## 2023-10-20 DIAGNOSIS — Z87.39 HISTORY OF GOUT: ICD-10-CM

## 2023-10-20 DIAGNOSIS — E86.0 DEHYDRATION: ICD-10-CM

## 2023-10-20 DIAGNOSIS — R07.89 CHEST WALL PAIN: ICD-10-CM

## 2023-10-20 DIAGNOSIS — R07.1 CHEST PAIN ON BREATHING: Primary | ICD-10-CM

## 2023-10-20 DIAGNOSIS — E66.3 OVERWEIGHT: ICD-10-CM

## 2023-10-20 LAB
ALBUMIN SERPL-MCNC: 4.1 G/DL (ref 3.4–5)
ALBUMIN/GLOB SERPL: 1.2 {RATIO} (ref 1.1–2.2)
ALP SERPL-CCNC: 90 U/L (ref 40–129)
ALT SERPL-CCNC: 22 U/L (ref 10–40)
ANION GAP SERPL CALCULATED.3IONS-SCNC: 13 MMOL/L (ref 3–16)
AST SERPL-CCNC: 13 U/L (ref 15–37)
BASOPHILS # BLD: 0.1 K/UL (ref 0–0.2)
BASOPHILS NFR BLD: 0.7 %
BILIRUB SERPL-MCNC: 1.9 MG/DL (ref 0–1)
BUN SERPL-MCNC: 8 MG/DL (ref 7–20)
CALCIUM SERPL-MCNC: 9.9 MG/DL (ref 8.3–10.6)
CHLORIDE SERPL-SCNC: 96 MMOL/L (ref 99–110)
CO2 SERPL-SCNC: 22 MMOL/L (ref 21–32)
CREAT SERPL-MCNC: 1.1 MG/DL (ref 0.9–1.3)
DEPRECATED RDW RBC AUTO: 16.5 % (ref 12.4–15.4)
EOSINOPHIL # BLD: 0 K/UL (ref 0–0.6)
EOSINOPHIL NFR BLD: 0.3 %
GFR SERPLBLD CREATININE-BSD FMLA CKD-EPI: >60 ML/MIN/{1.73_M2}
GLUCOSE SERPL-MCNC: 115 MG/DL (ref 70–99)
HCT VFR BLD AUTO: 43.9 % (ref 40.5–52.5)
HGB BLD-MCNC: 14.6 G/DL (ref 13.5–17.5)
LYMPHOCYTES # BLD: 1.1 K/UL (ref 1–5.1)
LYMPHOCYTES NFR BLD: 6.3 %
MCH RBC QN AUTO: 28.1 PG (ref 26–34)
MCHC RBC AUTO-ENTMCNC: 33.3 G/DL (ref 31–36)
MCV RBC AUTO: 84.3 FL (ref 80–100)
MONOCYTES # BLD: 1.4 K/UL (ref 0–1.3)
MONOCYTES NFR BLD: 8.2 %
NEUTROPHILS # BLD: 14.3 K/UL (ref 1.7–7.7)
NEUTROPHILS NFR BLD: 84.5 %
PLATELET # BLD AUTO: 299 K/UL (ref 135–450)
PMV BLD AUTO: 9 FL (ref 5–10.5)
POTASSIUM SERPL-SCNC: 4.6 MMOL/L (ref 3.5–5.1)
PROT SERPL-MCNC: 7.5 G/DL (ref 6.4–8.2)
RBC # BLD AUTO: 5.21 M/UL (ref 4.2–5.9)
SODIUM SERPL-SCNC: 131 MMOL/L (ref 136–145)
TROPONIN, HIGH SENSITIVITY: 13 NG/L (ref 0–22)
WBC # BLD AUTO: 16.9 K/UL (ref 4–11)

## 2023-10-20 PROCEDURE — 85025 COMPLETE CBC W/AUTO DIFF WBC: CPT

## 2023-10-20 PROCEDURE — 2580000003 HC RX 258: Performed by: EMERGENCY MEDICINE

## 2023-10-20 PROCEDURE — 6360000002 HC RX W HCPCS: Performed by: EMERGENCY MEDICINE

## 2023-10-20 PROCEDURE — 6360000004 HC RX CONTRAST MEDICATION: Performed by: EMERGENCY MEDICINE

## 2023-10-20 PROCEDURE — 80053 COMPREHEN METABOLIC PANEL: CPT

## 2023-10-20 PROCEDURE — 6370000000 HC RX 637 (ALT 250 FOR IP): Performed by: EMERGENCY MEDICINE

## 2023-10-20 PROCEDURE — 84484 ASSAY OF TROPONIN QUANT: CPT

## 2023-10-20 PROCEDURE — 71260 CT THORAX DX C+: CPT

## 2023-10-20 PROCEDURE — 99285 EMERGENCY DEPT VISIT HI MDM: CPT

## 2023-10-20 PROCEDURE — 93005 ELECTROCARDIOGRAM TRACING: CPT | Performed by: EMERGENCY MEDICINE

## 2023-10-20 PROCEDURE — 96374 THER/PROPH/DIAG INJ IV PUSH: CPT

## 2023-10-20 PROCEDURE — 96361 HYDRATE IV INFUSION ADD-ON: CPT

## 2023-10-20 RX ORDER — 0.9 % SODIUM CHLORIDE 0.9 %
1000 INTRAVENOUS SOLUTION INTRAVENOUS ONCE
Status: COMPLETED | OUTPATIENT
Start: 2023-10-20 | End: 2023-10-20

## 2023-10-20 RX ORDER — KETOROLAC TROMETHAMINE 15 MG/ML
15 INJECTION, SOLUTION INTRAMUSCULAR; INTRAVENOUS ONCE
Status: COMPLETED | OUTPATIENT
Start: 2023-10-20 | End: 2023-10-20

## 2023-10-20 RX ORDER — METHOCARBAMOL 500 MG/1
500 TABLET, FILM COATED ORAL 4 TIMES DAILY PRN
Qty: 20 TABLET | Refills: 0 | Status: SHIPPED | OUTPATIENT
Start: 2023-10-20 | End: 2023-10-25

## 2023-10-20 RX ORDER — DIAZEPAM 5 MG/1
5 TABLET ORAL ONCE
Status: COMPLETED | OUTPATIENT
Start: 2023-10-20 | End: 2023-10-20

## 2023-10-20 RX ORDER — MELOXICAM 7.5 MG/1
7.5-15 TABLET ORAL DAILY
Qty: 60 TABLET | Refills: 0 | Status: SHIPPED | OUTPATIENT
Start: 2023-10-20 | End: 2023-11-19

## 2023-10-20 RX ORDER — DEXAMETHASONE 4 MG/1
8 TABLET ORAL ONCE
Status: COMPLETED | OUTPATIENT
Start: 2023-10-20 | End: 2023-10-20

## 2023-10-20 RX ADMIN — LIDOCAINE HYDROCHLORIDE 100 MG: 20 INJECTION INTRAVENOUS at 19:00

## 2023-10-20 RX ADMIN — DIAZEPAM 5 MG: 5 TABLET ORAL at 21:35

## 2023-10-20 RX ADMIN — DEXAMETHASONE 8 MG: 4 TABLET ORAL at 21:35

## 2023-10-20 RX ADMIN — SODIUM CHLORIDE 1000 ML: 9 INJECTION, SOLUTION INTRAVENOUS at 21:35

## 2023-10-20 RX ADMIN — IOPAMIDOL 75 ML: 755 INJECTION, SOLUTION INTRAVENOUS at 19:32

## 2023-10-20 RX ADMIN — KETOROLAC TROMETHAMINE 15 MG: 15 INJECTION, SOLUTION INTRAMUSCULAR; INTRAVENOUS at 18:38

## 2023-10-20 ASSESSMENT — PAIN DESCRIPTION - DESCRIPTORS: DESCRIPTORS: ACHING

## 2023-10-20 ASSESSMENT — PAIN SCALES - GENERAL
PAINLEVEL_OUTOF10: 10
PAINLEVEL_OUTOF10: 10
PAINLEVEL_OUTOF10: 3

## 2023-10-20 ASSESSMENT — PAIN DESCRIPTION - ORIENTATION
ORIENTATION: RIGHT
ORIENTATION: MID

## 2023-10-20 ASSESSMENT — PAIN DESCRIPTION - LOCATION
LOCATION: BACK
LOCATION: BACK;CHEST

## 2023-10-20 ASSESSMENT — PAIN - FUNCTIONAL ASSESSMENT: PAIN_FUNCTIONAL_ASSESSMENT: 0-10

## 2023-10-20 NOTE — ED NOTES
Shift handoff report given to  [facility] . VSS and call light within reach. All questions answered at this time.        Destinee Winslow RN  10/20/23 5931

## 2023-10-20 NOTE — ED PROVIDER NOTES
[address] [address]        Pt Name: Markus Felix  MRN: [id number]  [address] [facility] 1990  Date of evaluation: 10/20/2023  Provider: Estrella Nina MD  PCP: GRACIA Owen  Note Started: 5:53 PM EDT 10/20/23    CHIEF COMPLAINT     Chest pain and I cannot breathe and I cannot walk and joint pain  HISTORY OF PRESENT ILLNESS: 1 or more Elements     Chief Complaint   Patient presents with    Chest Pain     Chest pain and mid back pain since last night. History from : Patient  Limitations to history : None    Markus Felix is a 34 y.o. male who presents to the emergency department secondary to concern for multiple different symptoms. He states that overall he started feeling unwell last night, he states that he had some pain in his chest that was present when he moved and he thought maybe he had pulled something or strained a muscle. He states he woke up this morning however and he had worsening pain in his chest as well as in his back and now it felt like more than spasms. He states it hurts to breathe. He reports that he has a history of rheumatoid arthritis but is not yet on any medication, his rheumatology appointment is not until January. He also has a history of gout, he is currently on allopurinol and colchicine, his primary care started that 6 weeks ago. He is not on any other new medications. He states that because of the arthritis he frequently gets pain in his joints and flareups where he cannot walk. He states that normally the pain is in his knees and ankles but today it is more in his hands back and chest.  He is on steroids intermittently and Norco for this pain. He took his last Norco today before coming in. He states his primary care has been prescribing this for his pain, he was last prescribed 18 tablets approximately 2 or 3 weeks ago.   He states he has been feeling hot but no known fevers, no chills no nausea no

## 2023-10-21 LAB
EKG ATRIAL RATE: 135 BPM
EKG DIAGNOSIS: NORMAL
EKG P AXIS: 47 DEGREES
EKG P-R INTERVAL: 130 MS
EKG Q-T INTERVAL: 388 MS
EKG QRS DURATION: 86 MS
EKG QTC CALCULATION (BAZETT): 582 MS
EKG R AXIS: 52 DEGREES
EKG T AXIS: 52 DEGREES
EKG VENTRICULAR RATE: 135 BPM

## 2023-10-21 NOTE — ED NOTES
Discharge and education instructions reviewed. Patient verbalized understanding, teach-back successful. Patient denied questions at this time. No acute distress noted. Patient instructed to follow-up as noted - return to emergency department if symptoms worsen. Patient verbalized understanding. Discharged per EDMD with discharge instructions.        Fermin Anna RN  10/20/23 3454

## 2023-10-21 NOTE — DISCHARGE INSTRUCTIONS
The CT scan today did not show any signs of blood clots or infection. Your EKG also had no signs of heart attack. We talked about how since you had COVID you have had some changes in your body, keep calling the rheumatology office to see if he can get in sooner than January. In the meantime we talked about treatment with anti-inflammatory pain medication (meloxicam) in addition to muscle relaxers (Robaxin). You did receive a dose of Decadron here which is a long-acting steroid that stays in your system for approximately 72 hours. Keep working on weight loss, while it is not causing your symptoms it is definitely augmenting and making them worse. We also talked about quitting smoking, you can do it! We would like for you to follow-up with your primary care. Return to emergency department for any new or worsening symptoms or concerns.

## 2024-03-07 ENCOUNTER — TELEPHONE (OUTPATIENT)
Dept: PULMONOLOGY | Age: 34
End: 2024-03-07

## 2024-03-07 NOTE — TELEPHONE ENCOUNTER
Patients mother is calling, patient does not have insurance. He had a sleep study last year in April and mother wants to  the order and sleep study if possible tomorrow around noon. She is trying to get a cpap for him and needs the order. Please call to confirm she can pick this up tomorrow.

## 2024-05-02 ENCOUNTER — TELEPHONE (OUTPATIENT)
Dept: PULMONOLOGY | Age: 34
End: 2024-05-02

## 2024-05-02 NOTE — TELEPHONE ENCOUNTER
Mother called requesting a rx to  for the patients sleep machine. Last order I saw was from 2023. I informed mom that she may need an updated one. She said she needs it by tomorrow. Please FU

## 2024-05-03 NOTE — TELEPHONE ENCOUNTER
Order in [facility]oxy.  I am at [employer] and can't print out.  Attempted to call mother to let her know she can  but got a message of voicemail not set up.

## 2024-05-03 NOTE — TELEPHONE ENCOUNTER
Patient had sleep study in 4/2023.  Never followed thur with therapy due to insurance.  Will he require new ov and sleep study or can he get an updated order for PAP machine?

## 2024-05-03 NOTE — PROGRESS NOTES
Romero Haynes         : 1990  [] [facility]     [] [facility]      [] [facility]     [][facility]    [] [facility]  [] [facility]  [] [facility]   [] [facility] [] Other:  Diagnosis: [x] BRAIN (G47.33) [] CSA (G47.31) [] Apnea (G47.30)   Length of Need: [] 12 Months [x] 99 Months [] Other:    Machine (ANDREW!):  [x] ResMed AirSense     Auto [] Other:     [x]  CPAP () [] Bilevel ()   Mode: [x] Auto [] Spontaneous    Mode: [] Auto [] Spontaneous         New machine  Between 6 and 20 cm                 Comfort Settings:     If the order for CPAP  - Ramp Pressure: 5 cmH2O                                        - Ramp time: 15 min                                     -  Flex/EPR - 3 full time       Humidifier: [x] Heated ()        [x] Water chamber replacement ()/ 1 per 6 months        Mask:  Please always start with the mask the patient used during the titraion   [x] Nasal () /1 per 3 months [x] Full Face () /1 per 3 months   [x] Patient choice -Size and fit mask [x] Patient Choice - Size and fit mask   [] Dispense:  [] Dispense:    [x] Headgear () / 1 per 6 months [x] Headgear () / 1 per 3 months   [x] Replacement Nasal Cushion ()/2 per month [x] Interface Replacement ()/1 per month   [x] Replacement Nasal Pillows ()/2 per month         Tubing: [x] Heated ()/1 per 3 months    [] Standard ()/1 per 3 months [] Other:           Filters: [x] Non-disposable ()/1 per 6 months     [x] Ultra-Fine, Disposable ()/2 per month        Miscellaneous: [x] Chin Strap ()/ 1 per 6 months [] O2 bleed-in:       LPM   [] Oximetry on CPAP/Bilevel []  Other:          Start Order Date: 24    MEDICAL JUSTIFICATION:  I, the undersigned, certify that the above prescribed supplies are medically necessary for this patient’s wellbeing.  In my opinion, the supplies are both reasonable and necessary in reference to accepted standards of

## 2024-05-09 ENCOUNTER — TELEPHONE (OUTPATIENT)
Dept: SLEEP CENTER | Age: 34
End: 2024-05-09

## 2024-06-17 ENCOUNTER — TELEPHONE (OUTPATIENT)
Dept: PAIN MANAGEMENT | Age: 34
End: 2024-06-17

## 2024-06-17 ENCOUNTER — OFFICE VISIT (OUTPATIENT)
Dept: PAIN MANAGEMENT | Age: 34
End: 2024-06-17
Payer: COMMERCIAL

## 2024-06-17 VITALS
BODY MASS INDEX: 44.1 KG/M2 | DIASTOLIC BLOOD PRESSURE: 89 MMHG | OXYGEN SATURATION: 93 % | HEART RATE: 99 BPM | SYSTOLIC BLOOD PRESSURE: 147 MMHG | HEIGHT: 71 IN | WEIGHT: 315 LBS

## 2024-06-17 DIAGNOSIS — G89.4 CHRONIC PAIN SYNDROME: ICD-10-CM

## 2024-06-17 DIAGNOSIS — M1A.9XX0 CHRONIC GOUT INVOLVING TOE OF RIGHT FOOT, UNSPECIFIED CAUSE: ICD-10-CM

## 2024-06-17 DIAGNOSIS — Z51.81 ENCOUNTER FOR THERAPEUTIC DRUG MONITORING: ICD-10-CM

## 2024-06-17 DIAGNOSIS — M50.90 CERVICAL DISC DISEASE: ICD-10-CM

## 2024-06-17 DIAGNOSIS — M1A.0720 IDIOPATHIC CHRONIC GOUT OF LEFT FOOT WITHOUT TOPHUS: ICD-10-CM

## 2024-06-17 DIAGNOSIS — M1A.0720 IDIOPATHIC CHRONIC GOUT OF LEFT FOOT WITHOUT TOPHUS: Primary | ICD-10-CM

## 2024-06-17 DIAGNOSIS — M17.0 BILATERAL PRIMARY OSTEOARTHRITIS OF KNEE: ICD-10-CM

## 2024-06-17 PROCEDURE — 99204 OFFICE O/P NEW MOD 45 MIN: CPT | Performed by: NURSE PRACTITIONER

## 2024-06-17 RX ORDER — OXYCODONE AND ACETAMINOPHEN 7.5; 325 MG/1; MG/1
1 TABLET ORAL EVERY 8 HOURS PRN
Qty: 84 TABLET | Refills: 0 | Status: SHIPPED | OUTPATIENT
Start: 2024-06-17 | End: 2024-06-25 | Stop reason: SDUPTHER

## 2024-06-17 RX ORDER — PROBENECID 500 MG/1
TABLET, FILM COATED ORAL
COMMUNITY
Start: 2024-02-23

## 2024-06-17 RX ORDER — ACETAMINOPHEN 325 MG/1
650 TABLET ORAL EVERY 6 HOURS PRN
COMMUNITY

## 2024-06-17 NOTE — TELEPHONE ENCOUNTER
Submitted PA for Oxycodone-Acetaminophen Via CMM Key: [id number] STATUS: NOT SENT    Please complete Chart Note.    Once complete, respond to the pool ( P MHCX PSC MEDICATION PRE-AUTH).      Thank you.

## 2024-06-24 PROBLEM — Z51.81 ENCOUNTER FOR THERAPEUTIC DRUG MONITORING: Status: ACTIVE | Noted: 2024-06-24

## 2024-06-24 PROBLEM — M50.90 CERVICAL DISC DISEASE: Status: ACTIVE | Noted: 2024-06-24

## 2024-06-24 PROBLEM — M17.0 BILATERAL PRIMARY OSTEOARTHRITIS OF KNEE: Status: ACTIVE | Noted: 2024-06-24

## 2024-06-24 PROBLEM — G89.4 CHRONIC PAIN SYNDROME: Status: ACTIVE | Noted: 2024-06-24

## 2024-06-24 PROBLEM — M1A.9XX0: Status: ACTIVE | Noted: 2024-06-24

## 2024-06-24 PROBLEM — M1A.0720 IDIOPATHIC CHRONIC GOUT OF LEFT FOOT WITHOUT TOPHUS: Status: ACTIVE | Noted: 2024-06-24

## 2024-06-24 ASSESSMENT — ENCOUNTER SYMPTOMS
CHEST TIGHTNESS: 0
CONSTIPATION: 0
BACK PAIN: 1
SHORTNESS OF BREATH: 0
VOMITING: 0

## 2024-06-24 NOTE — TELEPHONE ENCOUNTER
The insurance called the pt to let them know the PA was approved. The pt had to do the 7 day fill so they will need another script sent to the pharmacy.   [facility] [address] [facility] - P [phone]

## 2024-06-24 NOTE — PROGRESS NOTES
HISTORY OF PRESENT ILLNESS:  Mr. Haynes is a 34 y.o. male presents for consultation at the request of Dr. Page. His presenting problems are bilateral feet pain, left worse than the right. He has also been evaluated by rheumatology, sleep medicine, PCP. Onset of pain began about 4 years ago.   He rates the pain 8/10 and describes it as sharp, aching, burning, tender, numbness, tingling. Pain is greater in his left foot than his right foot.  Pain is made worse by: standing, initiating walking. Activities that have been limited by pain that he otherwise tolerated well are working, walking, ADLs.  Alternative therapies he has previously attempted are treatment for gout through rheumatology. Current treatment regimen has helped relieve about 20% of the pain. Relieving factors of pain include pain medication, anti-inflammatories, oral steroids, massage, TENs unit, cane. Hedenies side effects from the current pain regimen. Patient reports mood is well and sleep patterns are Very poor. Patient deniesneurological bowel or bladder. Patient denies misusing/abusing his narcotic pain medications or using any illegal drugs. he admits to morning stiffness, fatigue, and denies chronic headaches.     Rheumatology has been treating his gout. He reports that his PCP did give him #60 percocet for PRN use in Dec, he has a couple tabs left. He has been dxd with sleep apnea and trying to get a mask to fit him better. He is working as a manager at [employer] and severe pain in dianne feet affect his ability to stand. He reports pain is so severe after work he can barely walk.     ROS:  Review of Systems   Constitutional:  Positive for fatigue. Negative for unexpected weight change.   HENT:  Negative for congestion and dental problem.    Eyes:  Positive for visual disturbance (history of 6 eye surgeries).   Respiratory:  Negative for chest tightness and shortness of breath.    Cardiovascular:  Negative for chest pain, palpitations and leg

## 2024-06-24 NOTE — TELEPHONE ENCOUNTER
Previous PA .  Submitted PA for Oxycodone Acetaminophen Via CMM Key: [id number] STATUS: PENDING.    Follow up done daily; if no decision with in three days we will refax.  If another three days goes by with no decision will call the insurance for status.

## 2024-06-25 RX ORDER — OXYCODONE AND ACETAMINOPHEN 7.5; 325 MG/1; MG/1
1 TABLET ORAL EVERY 8 HOURS PRN
Qty: 63 TABLET | Refills: 0 | Status: SHIPPED | OUTPATIENT
Start: 2024-06-25 | End: 2024-07-16

## 2024-06-26 DIAGNOSIS — M17.0 BILATERAL PRIMARY OSTEOARTHRITIS OF KNEE: ICD-10-CM

## 2024-06-26 DIAGNOSIS — M1A.0720 IDIOPATHIC CHRONIC GOUT OF LEFT FOOT WITHOUT TOPHUS: ICD-10-CM

## 2024-06-26 RX ORDER — OXYCODONE AND ACETAMINOPHEN 7.5; 325 MG/1; MG/1
TABLET ORAL
Qty: 21 TABLET | Refills: 0 | OUTPATIENT
Start: 2024-06-26

## 2024-07-03 ENCOUNTER — TELEPHONE (OUTPATIENT)
Dept: PAIN MANAGEMENT | Age: 34
End: 2024-07-03

## 2024-07-03 DIAGNOSIS — M1A.0720 IDIOPATHIC CHRONIC GOUT OF LEFT FOOT WITHOUT TOPHUS: ICD-10-CM

## 2024-07-03 DIAGNOSIS — M17.0 BILATERAL PRIMARY OSTEOARTHRITIS OF KNEE: ICD-10-CM

## 2024-07-03 RX ORDER — OXYCODONE AND ACETAMINOPHEN 7.5; 325 MG/1; MG/1
1 TABLET ORAL EVERY 8 HOURS PRN
Qty: 21 TABLET | Refills: 0 | Status: SHIPPED | OUTPATIENT
Start: 2024-07-03 | End: 2024-07-10

## 2024-12-10 ENCOUNTER — TELEPHONE (OUTPATIENT)
Dept: PULMONOLOGY | Age: 34
End: 2024-12-10

## 2024-12-10 NOTE — TELEPHONE ENCOUNTER
Received request to refill PAP supplies. Patient never followed up after sleep study 4/2023  Patient needs an appointment for this paperwork to be filled out.

## 2025-02-26 ASSESSMENT — SLEEP AND FATIGUE QUESTIONNAIRES
HOW LIKELY ARE YOU TO NOD OFF OR FALL ASLEEP WHILE SITTING AND READING: HIGH CHANCE OF DOZING
HOW LIKELY ARE YOU TO NOD OFF OR FALL ASLEEP WHILE WATCHING TV: HIGH CHANCE OF DOZING
HOW LIKELY ARE YOU TO NOD OFF OR FALL ASLEEP WHEN YOU ARE A PASSENGER IN A CAR FOR AN HOUR WITHOUT A BREAK: HIGH CHANCE OF DOZING
HOW LIKELY ARE YOU TO NOD OFF OR FALL ASLEEP WHILE SITTING AND TALKING TO SOMEONE: SLIGHT CHANCE OF DOZING
ESS TOTAL SCORE: 17
HOW LIKELY ARE YOU TO NOD OFF OR FALL ASLEEP WHILE LYING DOWN TO REST IN THE AFTERNOON WHEN CIRCUMSTANCES PERMIT: HIGH CHANCE OF DOZING
HOW LIKELY ARE YOU TO NOD OFF OR FALL ASLEEP WHILE SITTING INACTIVE IN A PUBLIC PLACE: SLIGHT CHANCE OF DOZING
HOW LIKELY ARE YOU TO NOD OFF OR FALL ASLEEP WHILE SITTING QUIETLY AFTER LUNCH WITHOUT ALCOHOL: MODERATE CHANCE OF DOZING
HOW LIKELY ARE YOU TO NOD OFF OR FALL ASLEEP IN A CAR, WHILE STOPPED FOR A FEW MINUTES IN TRAFFIC: SLIGHT CHANCE OF DOZING

## 2025-02-27 ASSESSMENT — SLEEP AND FATIGUE QUESTIONNAIRES
HOW LIKELY ARE YOU TO NOD OFF OR FALL ASLEEP WHILE SITTING QUIETLY AFTER LUNCH WITHOUT ALCOHOL: MODERATE CHANCE OF DOZING
HOW LIKELY ARE YOU TO NOD OFF OR FALL ASLEEP WHILE SITTING AND READING: HIGH CHANCE OF DOZING
HOW LIKELY ARE YOU TO NOD OFF OR FALL ASLEEP WHILE SITTING INACTIVE IN A PUBLIC PLACE: SLIGHT CHANCE OF DOZING
HOW LIKELY ARE YOU TO NOD OFF OR FALL ASLEEP WHEN YOU ARE A PASSENGER IN A CAR FOR AN HOUR WITHOUT A BREAK: HIGH CHANCE OF DOZING
HOW LIKELY ARE YOU TO NOD OFF OR FALL ASLEEP WHILE WATCHING TV: HIGH CHANCE OF DOZING
HOW LIKELY ARE YOU TO NOD OFF OR FALL ASLEEP IN A CAR, WHILE STOPPED FOR A FEW MINUTES IN TRAFFIC: SLIGHT CHANCE OF DOZING
HOW LIKELY ARE YOU TO NOD OFF OR FALL ASLEEP WHILE SITTING AND TALKING TO SOMEONE: SLIGHT CHANCE OF DOZING
HOW LIKELY ARE YOU TO NOD OFF OR FALL ASLEEP WHILE LYING DOWN TO REST IN THE AFTERNOON WHEN CIRCUMSTANCES PERMIT: HIGH CHANCE OF DOZING

## 2025-04-01 ENCOUNTER — APPOINTMENT (OUTPATIENT)
Dept: CT IMAGING | Age: 35
End: 2025-04-01
Payer: COMMERCIAL

## 2025-04-01 ENCOUNTER — APPOINTMENT (OUTPATIENT)
Dept: GENERAL RADIOLOGY | Age: 35
End: 2025-04-01
Payer: COMMERCIAL

## 2025-04-01 ENCOUNTER — OFFICE VISIT (OUTPATIENT)
Dept: SLEEP MEDICINE | Age: 35
End: 2025-04-01
Payer: COMMERCIAL

## 2025-04-01 ENCOUNTER — HOSPITAL ENCOUNTER (EMERGENCY)
Age: 35
Discharge: HOME | End: 2025-04-01
Payer: COMMERCIAL

## 2025-04-01 VITALS
HEART RATE: 103 BPM | RESPIRATION RATE: 18 BRPM | DIASTOLIC BLOOD PRESSURE: 80 MMHG | WEIGHT: 315 LBS | HEIGHT: 71 IN | BODY MASS INDEX: 44.1 KG/M2 | TEMPERATURE: 97.6 F | OXYGEN SATURATION: 98 % | SYSTOLIC BLOOD PRESSURE: 120 MMHG

## 2025-04-01 VITALS
HEART RATE: 89 BPM | TEMPERATURE: 98.1 F | SYSTOLIC BLOOD PRESSURE: 169 MMHG | RESPIRATION RATE: 22 BRPM | DIASTOLIC BLOOD PRESSURE: 91 MMHG | HEIGHT: 71 IN | OXYGEN SATURATION: 100 % | BODY MASS INDEX: 44.1 KG/M2 | WEIGHT: 315 LBS

## 2025-04-01 DIAGNOSIS — R06.89 GASPING FOR BREATH: ICD-10-CM

## 2025-04-01 DIAGNOSIS — M25.521 ARTHRALGIA OF RIGHT UPPER ARM: Primary | ICD-10-CM

## 2025-04-01 DIAGNOSIS — E66.01 CLASS 3 SEVERE OBESITY DUE TO EXCESS CALORIES WITH SERIOUS COMORBIDITY AND BODY MASS INDEX (BMI) OF 60.0 TO 69.9 IN ADULT: Primary | ICD-10-CM

## 2025-04-01 DIAGNOSIS — R06.83 SNORING: ICD-10-CM

## 2025-04-01 DIAGNOSIS — R06.81 WITNESSED EPISODE OF APNEA: ICD-10-CM

## 2025-04-01 DIAGNOSIS — G47.33 OSA (OBSTRUCTIVE SLEEP APNEA): ICD-10-CM

## 2025-04-01 DIAGNOSIS — G47.19 EXCESSIVE DAYTIME SLEEPINESS: ICD-10-CM

## 2025-04-01 DIAGNOSIS — E66.813 CLASS 3 SEVERE OBESITY DUE TO EXCESS CALORIES WITH SERIOUS COMORBIDITY AND BODY MASS INDEX (BMI) OF 60.0 TO 69.9 IN ADULT: Primary | ICD-10-CM

## 2025-04-01 LAB
ALBUMIN SERPL-MCNC: 3.7 G/DL (ref 3.4–5)
ALBUMIN/GLOB SERPL: 1.1 {RATIO} (ref 1.1–2.2)
ALP SERPL-CCNC: 101 U/L (ref 40–129)
ALT SERPL-CCNC: 42 U/L (ref 10–40)
ANION GAP SERPL CALCULATED.3IONS-SCNC: 15 MMOL/L (ref 3–16)
AST SERPL-CCNC: 16 U/L (ref 15–37)
BASOPHILS # BLD: 0 K/UL (ref 0–0.2)
BASOPHILS NFR BLD: 0.3 %
BILIRUB SERPL-MCNC: 0.4 MG/DL (ref 0–1)
BUN SERPL-MCNC: 11 MG/DL (ref 7–20)
CALCIUM SERPL-MCNC: 9.5 MG/DL (ref 8.3–10.6)
CHLORIDE SERPL-SCNC: 98 MMOL/L (ref 99–110)
CO2 SERPL-SCNC: 23 MMOL/L (ref 21–32)
CREAT SERPL-MCNC: 0.9 MG/DL (ref 0.9–1.3)
DEPRECATED RDW RBC AUTO: 14.8 % (ref 12.4–15.4)
EKG ATRIAL RATE: 110 BPM
EKG DIAGNOSIS: NORMAL
EKG P AXIS: 55 DEGREES
EKG P-R INTERVAL: 144 MS
EKG Q-T INTERVAL: 350 MS
EKG QRS DURATION: 102 MS
EKG QTC CALCULATION (BAZETT): 473 MS
EKG R AXIS: 41 DEGREES
EKG T AXIS: 56 DEGREES
EKG VENTRICULAR RATE: 110 BPM
EOSINOPHIL # BLD: 0.1 K/UL (ref 0–0.6)
EOSINOPHIL NFR BLD: 0.6 %
GFR SERPLBLD CREATININE-BSD FMLA CKD-EPI: >90 ML/MIN/{1.73_M2}
GLUCOSE SERPL-MCNC: 177 MG/DL (ref 70–99)
HCT VFR BLD AUTO: 39.6 % (ref 40.5–52.5)
HGB BLD-MCNC: 13.1 G/DL (ref 13.5–17.5)
LYMPHOCYTES # BLD: 1 K/UL (ref 1–5.1)
LYMPHOCYTES NFR BLD: 8 %
MCH RBC QN AUTO: 29.2 PG (ref 26–34)
MCHC RBC AUTO-ENTMCNC: 33.2 G/DL (ref 31–36)
MCV RBC AUTO: 88.2 FL (ref 80–100)
MONOCYTES # BLD: 0.7 K/UL (ref 0–1.3)
MONOCYTES NFR BLD: 5.6 %
NEUTROPHILS # BLD: 10.9 K/UL (ref 1.7–7.7)
NEUTROPHILS NFR BLD: 85.5 %
PLATELET # BLD AUTO: 319 K/UL (ref 135–450)
PMV BLD AUTO: 8.6 FL (ref 5–10.5)
POTASSIUM SERPL-SCNC: 3.5 MMOL/L (ref 3.5–5.1)
PROT SERPL-MCNC: 7.2 G/DL (ref 6.4–8.2)
RBC # BLD AUTO: 4.49 M/UL (ref 4.2–5.9)
RHEUMATOID FACT SER IA-ACNC: <10 IU/ML
SODIUM SERPL-SCNC: 136 MMOL/L (ref 136–145)
TROPONIN, HIGH SENSITIVITY: <6 NG/L (ref 0–22)
WBC # BLD AUTO: 12.7 K/UL (ref 4–11)

## 2025-04-01 PROCEDURE — 93010 ELECTROCARDIOGRAM REPORT: CPT | Performed by: INTERNAL MEDICINE

## 2025-04-01 PROCEDURE — 86038 ANTINUCLEAR ANTIBODIES: CPT

## 2025-04-01 PROCEDURE — 84484 ASSAY OF TROPONIN QUANT: CPT

## 2025-04-01 PROCEDURE — 86431 RHEUMATOID FACTOR QUANT: CPT

## 2025-04-01 PROCEDURE — 85025 COMPLETE CBC W/AUTO DIFF WBC: CPT

## 2025-04-01 PROCEDURE — 93005 ELECTROCARDIOGRAM TRACING: CPT | Performed by: EMERGENCY MEDICINE

## 2025-04-01 PROCEDURE — 86225 DNA ANTIBODY NATIVE: CPT

## 2025-04-01 PROCEDURE — 99285 EMERGENCY DEPT VISIT HI MDM: CPT

## 2025-04-01 PROCEDURE — 86235 NUCLEAR ANTIGEN ANTIBODY: CPT

## 2025-04-01 PROCEDURE — 71260 CT THORAX DX C+: CPT

## 2025-04-01 PROCEDURE — 71045 X-RAY EXAM CHEST 1 VIEW: CPT

## 2025-04-01 PROCEDURE — G2211 COMPLEX E/M VISIT ADD ON: HCPCS | Performed by: PSYCHIATRY & NEUROLOGY

## 2025-04-01 PROCEDURE — 83516 IMMUNOASSAY NONANTIBODY: CPT

## 2025-04-01 PROCEDURE — 80053 COMPREHEN METABOLIC PANEL: CPT

## 2025-04-01 PROCEDURE — 6360000004 HC RX CONTRAST MEDICATION: Performed by: PHYSICIAN ASSISTANT

## 2025-04-01 PROCEDURE — 99214 OFFICE O/P EST MOD 30 MIN: CPT | Performed by: PSYCHIATRY & NEUROLOGY

## 2025-04-01 PROCEDURE — 6370000000 HC RX 637 (ALT 250 FOR IP): Performed by: PHYSICIAN ASSISTANT

## 2025-04-01 PROCEDURE — 86039 ANTINUCLEAR ANTIBODIES (ANA): CPT

## 2025-04-01 RX ORDER — IBUPROFEN 400 MG/1
800 TABLET, FILM COATED ORAL ONCE
Status: COMPLETED | OUTPATIENT
Start: 2025-04-01 | End: 2025-04-01

## 2025-04-01 RX ORDER — IOPAMIDOL 755 MG/ML
150 INJECTION, SOLUTION INTRAVASCULAR
Status: COMPLETED | OUTPATIENT
Start: 2025-04-01 | End: 2025-04-01

## 2025-04-01 RX ORDER — OXYCODONE AND ACETAMINOPHEN 5; 325 MG/1; MG/1
1 TABLET ORAL ONCE
Refills: 0 | Status: COMPLETED | OUTPATIENT
Start: 2025-04-01 | End: 2025-04-01

## 2025-04-01 RX ORDER — TRAMADOL HYDROCHLORIDE 50 MG/1
50 TABLET ORAL EVERY 6 HOURS PRN
Qty: 12 TABLET | Refills: 0 | Status: SHIPPED | OUTPATIENT
Start: 2025-04-01 | End: 2025-04-04

## 2025-04-01 RX ADMIN — OXYCODONE AND ACETAMINOPHEN 1 TABLET: 325; 5 TABLET ORAL at 14:44

## 2025-04-01 RX ADMIN — IOPAMIDOL 150 ML: 755 INJECTION, SOLUTION INTRAVENOUS at 15:41

## 2025-04-01 RX ADMIN — IBUPROFEN 800 MG: 400 TABLET, FILM COATED ORAL at 14:44

## 2025-04-01 ASSESSMENT — PAIN SCALES - GENERAL
PAINLEVEL_OUTOF10: 9
PAINLEVEL_OUTOF10: 9

## 2025-04-01 ASSESSMENT — PAIN DESCRIPTION - ORIENTATION: ORIENTATION: RIGHT

## 2025-04-01 ASSESSMENT — PAIN DESCRIPTION - PAIN TYPE: TYPE: ACUTE PAIN

## 2025-04-01 ASSESSMENT — PAIN DESCRIPTION - ONSET: ONSET: ON-GOING

## 2025-04-01 ASSESSMENT — PAIN DESCRIPTION - FREQUENCY: FREQUENCY: CONTINUOUS

## 2025-04-01 ASSESSMENT — PAIN DESCRIPTION - LOCATION: LOCATION: HAND

## 2025-04-01 ASSESSMENT — PAIN DESCRIPTION - DESCRIPTORS: DESCRIPTORS: DISCOMFORT

## 2025-04-01 NOTE — PROGRESS NOTES
MD SUKHDEEP Howard Board Certified in Sleep Medicine  Certified in Behavioral Sleep Medicine  Board Certified in Neurology [facility]  [address]   [address]  [place]  [address]  [phone]   [facility]  [address]  [address]  [place], [place] [address]                      [facility] [place] [facility]  [address] [address] [phone]    Subjective:     Patient ID: Romero Haynes is a 35 y.o. male.    Chief Complaint   Patient presents with    Follow-up    Sleep Apnea       HPI:        Romero Haynes is a 35 y.o. male was seen today as a follow for obstructive sleep apnea. The patient underwent home sleep testing on 04/06/2023, the overnight registration revealed severe obstructive sleep apnea with apnea hypopnea index of 134.0/hr with lowest O2 saturation of 61%, patient spent about 150.7 minutes below 90% (weight was 422 pounds).   He complains of snoring, snorting, choking, periods of not breathing, tossing and turning, excessive daytime sleepiness, feels sleepy during the day witnessed apnea.  Has not tried the CPAP machine yet and lost the follow up.   The Patient scored [place] Sleepiness Score: (Patient-Rptd) 17 on [place] Sleepiness Scale ( more than 10 is indicative of daytime sleepiness)   BP is stable. Has gained 16 pounds since last visit.   DOT/CDL - N/A      Previous Report(s)Reviewed: historical medical records         Social History     Socioeconomic History    Marital status: Single     Spouse name: Not on file    Number of children: Not on file    Years of education: Not on file    Highest education level: Not on file   Occupational History    Not on file   Tobacco Use    Smoking status: Some Days     Current packs/day: 0.25     Average packs/day: 0.3 packs/day for 15.2 years (3.8 ttl pk-yrs)     Types: Cigarettes     Start date: 1/1/2010

## 2025-04-01 NOTE — PATIENT INSTRUCTIONS
Orders Placed This Encounter   Procedures    Sleep Study with PAP Titration     Standing Status:   Future     Expected Date:   4/1/2025     Expiration Date:   4/1/2026     Sleep Study Titration Type:   Split Night Study (Baseline followed by PAP Titration)     Location For Sleep Study:   [facility] Location:   [facility]

## 2025-04-01 NOTE — ED PROVIDER NOTES
scan.  A has pain with movement.  Forearm and upper arm shows no tenderness with palpation.  Everything seems to be affected from the right shoulder to the right and.    Because of his tachycardia and he complained of dizziness when had chest x-ray.  Also got CT of his chest rule out PE.    I discussed with patient we will check his rheumatoid factors.  And MARY.  Most likely I will refer him to the rheumatologist.    Lab result from today shows:  CBC has a white count of 12.7.  RBC 4.49, hemoglobin 13.1 hematocrit 39.6.  CMP shows ALT slightly elevated at 42.  Random glucose 177.  All other value within normal limits.    Troponin less than 6  Rheumatoid factor less than 10  MARY results pending.    Discussed with patient his results.  Inform him the MARY is pending.  I will refer him to rheumatologist to follow-up with.  Otherwise follow-up with his primary care provider.  I discussed putting him on tramadol for pain.  He is okay with this plan.  He will be discharged stable condition.  Diagnosed with arthralgia of right upper arm.    Disposition Considerations (Tests not ordered but considered, Shared Decision Making, Pt Expectation of Test or Tx.):     See discussion above        The patient tolerated their visit well.  I evaluated the patient.  The physician was available for consultation as needed.  The patient and / or the family were informed of the results of any tests, a time was given to answer questions, a plan was proposed and they agreed with plan.     I am the Primary Clinician of Record.     CLINICAL IMPRESSION:  1. Arthralgia of right upper arm        DISPOSITION Decision To Discharge 04/01/2025 04:50:36 PM   DISPOSITION CONDITION Stable           PATIENT REFERRED TO:  Geovanna Page PA  [address] [address] [phone]    Call in 1 day  For follow-up    [facility]  [address] [id number] [phone]  Call in 1 day  For

## 2025-04-01 NOTE — DISCHARGE INSTRUCTIONS
Take prescribed medication as prescribed  Follow-up with [facility] rheumatology clinic.

## 2025-04-01 NOTE — ED TRIAGE NOTES
Patient to the ER with multiple complaints that started Friday evening.   Patient has a swollen right hand with severe pain that travels up his arm into his right shoulder.  He denies any known injury.  He was seen at  on Friday and given steroids and told it was gout.  He already takes daily medications for gout and has been taking his steroid but the pain and swelling is getting worse.       He also complains of dizziness, chest tightness, and a sharp pain in his upper back that is worse on inspiration.  These symptoms also started on Friday.     Alert and oriented x4 at time of triage.

## 2025-04-02 LAB
ANA SER QL IA: POSITIVE
CENTROMERE B IGG SER QL LINE BLOT: <0.2 AI (ref 0–0.9)
CHROMATIN AB SERPL-ACNC: <0.2 AI (ref 0–0.9)
DSDNA AB SER-ACNC: 17 IU/ML (ref 0–9)
ENA JO1 AB SER IA-ACNC: <0.2 AI (ref 0–0.9)
ENA RNP AB SER IA-ACNC: <0.2 AI (ref 0–0.9)
ENA SCL70 IGG SER IA-ACNC: <0.2 AI (ref 0–0.9)
ENA SM AB SER IA-ACNC: <0.2 AI (ref 0–0.9)
ENA SM+RNP IGG SER-ACNC: <0.2 AI (ref 0–0.9)
ENA SS-A AB SER IA-ACNC: <0.2 AI (ref 0–0.9)
ENA SS-B AB SER IA-ACNC: <0.2 AI (ref 0–0.9)
RIBOSOMAL P AB SER IA-ACNC: <0.2 AI (ref 0–0.9)

## 2025-04-04 LAB
ANTINUCLEAR AB INTERPRETIVE COMMENT: NORMAL
NUCLEAR IGG SER QL IF: NORMAL

## 2025-04-04 ASSESSMENT — ENCOUNTER SYMPTOMS
COUGH: 0
SHORTNESS OF BREATH: 0
EYE PAIN: 0
ABDOMINAL PAIN: 0
BACK PAIN: 0
VOMITING: 0
SORE THROAT: 0
NAUSEA: 0

## 2025-04-17 ENCOUNTER — HOSPITAL ENCOUNTER (OUTPATIENT)
Dept: SLEEP CENTER | Age: 35
Discharge: HOME | End: 2025-04-17
Payer: COMMERCIAL

## 2025-04-17 DIAGNOSIS — G47.33 OSA (OBSTRUCTIVE SLEEP APNEA): ICD-10-CM

## 2025-04-17 PROCEDURE — 95811 POLYSOM 6/>YRS CPAP 4/> PARM: CPT

## 2025-04-21 NOTE — PROGRESS NOTES
Romero Dotsonms         : 1990  [] [facility]     [] [facility]      [] [facility]     [][facility]    [] [employer]  [] [employer]   [] [facility]  [] [employer] [] [employer] [] [employer] [] [facility] [] [employer] [] [facility]  Diagnosis: [x] BRAIN (G47.33) [] CSA (G47.31) [] Apnea (G47.30)   Length of Need: [] 12 Months [x] 99 Months [] Other:    Machine (ANDREW!):  [x] ResMed AirSense     Auto [] Other:     []  CPAP () [x] Bilevel ()   Mode: [] Auto [] Spontaneous    Mode: [] Auto [] Spontaneous                     22/17 cm        Comfort Settings:       - Ramp Pressure: 8 cmH2O                                        - Ramp time: 15 min                                     -  Flex/EPR - 3 full time  If the order is for BiLevel:                                    - For ResMed Bilevel (TiMax-4 sec   TiMin- 0.2 sec)     Humidifier: [x] Heated ()        [x] Water chamber replacement ()/ 1 per 6 months        Mask:  Please always start with the mask the patient used during the titraion    [x] Full Face () /1 per 3 months    [x] Patient Choice - Size and fit mask    [] Dispense: medium Airfit F30i     [x] Headgear () / 1 per 3 months    [x] Interface Replacement ()/1 per month            Tubing: [x] Heated ()/1 per 3 months    [] Standard ()/1 per 3 months [] Other:           Filters: [x] Non-disposable ()/1 per 6 months     [x] Ultra-Fine, Disposable ()/2 per month        Miscellaneous: [x] Chin Strap ()/ 1 per 6 months [] O2 bleed-in:       LPM   [] Oximetry on CPAP/Bilevel []  Other:          Start Order Date: 25    MEDICAL JUSTIFICATION:  I, the undersigned, certify that the above prescribed supplies are medically necessary for this patient’s wellbeing.  In my opinion, the supplies are both reasonable and necessary in reference to accepted standards of medicalpractice in treatment of this patient’s condition.    Vanessa

## 2025-04-23 ENCOUNTER — TELEPHONE (OUTPATIENT)
Dept: PULMONOLOGY | Age: 35
End: 2025-04-23

## 2025-04-23 NOTE — TELEPHONE ENCOUNTER
Split night study showed severe BRAIN (on a scale of mild, moderate and severe).  AHI was 128  per hr. (Average times per hr breathing was obstructed).  O2 Desaturations to 66% (lowest o2)   Dr Recommends:    Follow up with the patient's sleep physician to discuss results      Avoid sedatives, alcohol and caffeinated drinks at bedtime.    Recommend:  BiPAP 22/17         Avoid driving while sleepy.       LMOM to call     NEED DME - live in [place]

## 2025-06-30 ENCOUNTER — TELEPHONE (OUTPATIENT)
Dept: PULMONOLOGY | Age: 35
End: 2025-06-30

## 2025-07-07 ASSESSMENT — SLEEP AND FATIGUE QUESTIONNAIRES
HOW LIKELY ARE YOU TO NOD OFF OR FALL ASLEEP IN A CAR, WHILE STOPPED FOR A FEW MINUTES IN TRAFFIC: WOULD NEVER DOZE
HOW LIKELY ARE YOU TO NOD OFF OR FALL ASLEEP IN A CAR, WHILE STOPPED FOR A FEW MINUTES IN TRAFFIC: WOULD NEVER DOZE
HOW LIKELY ARE YOU TO NOD OFF OR FALL ASLEEP WHILE LYING DOWN TO REST IN THE AFTERNOON WHEN CIRCUMSTANCES PERMIT: HIGH CHANCE OF DOZING
HOW LIKELY ARE YOU TO NOD OFF OR FALL ASLEEP WHILE SITTING QUIETLY AFTER LUNCH WITHOUT ALCOHOL: SLIGHT CHANCE OF DOZING
HOW LIKELY ARE YOU TO NOD OFF OR FALL ASLEEP WHEN YOU ARE A PASSENGER IN A CAR FOR AN HOUR WITHOUT A BREAK: MODERATE CHANCE OF DOZING
HOW LIKELY ARE YOU TO NOD OFF OR FALL ASLEEP WHILE SITTING AND TALKING TO SOMEONE: SLIGHT CHANCE OF DOZING
HOW LIKELY ARE YOU TO NOD OFF OR FALL ASLEEP WHILE SITTING AND READING: MODERATE CHANCE OF DOZING
HOW LIKELY ARE YOU TO NOD OFF OR FALL ASLEEP WHILE LYING DOWN TO REST IN THE AFTERNOON WHEN CIRCUMSTANCES PERMIT: HIGH CHANCE OF DOZING
HOW LIKELY ARE YOU TO NOD OFF OR FALL ASLEEP WHILE WATCHING TV: HIGH CHANCE OF DOZING
HOW LIKELY ARE YOU TO NOD OFF OR FALL ASLEEP WHILE SITTING AND TALKING TO SOMEONE: SLIGHT CHANCE OF DOZING
HOW LIKELY ARE YOU TO NOD OFF OR FALL ASLEEP WHILE SITTING INACTIVE IN A PUBLIC PLACE: SLIGHT CHANCE OF DOZING
HOW LIKELY ARE YOU TO NOD OFF OR FALL ASLEEP WHILE WATCHING TV: HIGH CHANCE OF DOZING
HOW LIKELY ARE YOU TO NOD OFF OR FALL ASLEEP WHILE SITTING INACTIVE IN A PUBLIC PLACE: SLIGHT CHANCE OF DOZING
ESS TOTAL SCORE: 13
HOW LIKELY ARE YOU TO NOD OFF OR FALL ASLEEP WHILE SITTING AND READING: MODERATE CHANCE OF DOZING
HOW LIKELY ARE YOU TO NOD OFF OR FALL ASLEEP WHILE SITTING QUIETLY AFTER LUNCH WITHOUT ALCOHOL: SLIGHT CHANCE OF DOZING
HOW LIKELY ARE YOU TO NOD OFF OR FALL ASLEEP WHEN YOU ARE A PASSENGER IN A CAR FOR AN HOUR WITHOUT A BREAK: MODERATE CHANCE OF DOZING

## 2025-07-08 ENCOUNTER — OFFICE VISIT (OUTPATIENT)
Dept: SLEEP MEDICINE | Age: 35
End: 2025-07-08
Payer: COMMERCIAL

## 2025-07-08 VITALS
WEIGHT: 315 LBS | HEART RATE: 117 BPM | DIASTOLIC BLOOD PRESSURE: 80 MMHG | HEIGHT: 71 IN | TEMPERATURE: 97.9 F | OXYGEN SATURATION: 98 % | RESPIRATION RATE: 18 BRPM | SYSTOLIC BLOOD PRESSURE: 115 MMHG | BODY MASS INDEX: 44.1 KG/M2

## 2025-07-08 DIAGNOSIS — Z99.89 DEPENDENCE ON OTHER ENABLING MACHINES AND DEVICES: ICD-10-CM

## 2025-07-08 DIAGNOSIS — G47.33 OSA (OBSTRUCTIVE SLEEP APNEA): Primary | ICD-10-CM

## 2025-07-08 DIAGNOSIS — E66.813 CLASS 3 SEVERE OBESITY DUE TO EXCESS CALORIES WITH SERIOUS COMORBIDITY AND BODY MASS INDEX (BMI) OF 60.0 TO 69.9 IN ADULT (HCC): ICD-10-CM

## 2025-07-08 PROCEDURE — 99214 OFFICE O/P EST MOD 30 MIN: CPT | Performed by: PSYCHIATRY & NEUROLOGY

## 2025-07-08 PROCEDURE — G2211 COMPLEX E/M VISIT ADD ON: HCPCS | Performed by: PSYCHIATRY & NEUROLOGY

## 2025-07-08 NOTE — PROGRESS NOTES
MD SUKHDEEP Howard Board Certified in Sleep Medicine  Certified in Behavioral Sleep Medicine  Board Certified in Neurology [facility]  [address]   [address]  [place]  [address]  [phone]   [facility]  [address]  [address]  [place], [place] [address]                      [facility] [place] [facility]  [address] [address] [phone]    Subjective:     Patient ID: Romero Haynes is a 35 y.o. male.    Chief Complaint   Patient presents with    Follow-up    Sleep Apnea       HPI:        Romero Haynes is a 35 y.o. male was seen today as a follow for obstructive sleep apnea. The patient underwent split comprehensive polysomnogram on 04/17/2025, the overnight registration revealed severe obstructive sleep apnea with apnea hypopnea index of 127.5/hr with lowest O2 saturation of 66%, patient spent about 61.3 minutes below 90% (weight was 439 pounds). Subsequently, the patient underwent successful PAP titration on the second half of the night, the lowest O2 saturation while on PAP was 94%. At 22/17 cm  Has not received the BiPAP yet  Patient is still using the PAP machine about 39% of the time, more than 4 hours a night about  22 %, in total average of 4:31 hours a night in last 90 days.  Currently on PAP at 19.2 cm (6-20), the AHI is 8.2 events per hour at this pressure.  Patient improved regarding daytime sleepiness and fatigue, wakes up refreshed in the morning.  The Patient scored [place] Sleepiness Score: (Patient-Rptd) 13 on [place] Sleepiness Scale ( more than 10 is indicative of daytime sleepiness)   Uses FFM and has leakage.     BP is stable. Has gained 4 pounds since last visit. 439  DOT/CDL - N/A      Previous Report(s)Reviewed: historical medical records         Social History     Socioeconomic History    Marital status: Single     Spouse name: Not on file